# Patient Record
Sex: FEMALE | Race: WHITE | NOT HISPANIC OR LATINO
[De-identification: names, ages, dates, MRNs, and addresses within clinical notes are randomized per-mention and may not be internally consistent; named-entity substitution may affect disease eponyms.]

---

## 2017-02-07 ENCOUNTER — APPOINTMENT (OUTPATIENT)
Dept: FAMILY MEDICINE | Facility: CLINIC | Age: 82
End: 2017-02-07

## 2017-02-07 VITALS — TEMPERATURE: 97.4 F | DIASTOLIC BLOOD PRESSURE: 66 MMHG | SYSTOLIC BLOOD PRESSURE: 120 MMHG

## 2017-02-10 LAB — HBA1C MFR BLD HPLC: 5.9

## 2017-03-31 ENCOUNTER — APPOINTMENT (OUTPATIENT)
Dept: FAMILY MEDICINE | Facility: CLINIC | Age: 82
End: 2017-03-31

## 2017-03-31 VITALS — TEMPERATURE: 98.1 F | SYSTOLIC BLOOD PRESSURE: 120 MMHG | DIASTOLIC BLOOD PRESSURE: 70 MMHG

## 2017-03-31 DIAGNOSIS — F32.9 MAJOR DEPRESSIVE DISORDER, SINGLE EPISODE, UNSPECIFIED: ICD-10-CM

## 2017-04-01 PROBLEM — F32.9 DEPRESSION: Status: ACTIVE | Noted: 2017-04-01

## 2017-05-02 ENCOUNTER — RX RENEWAL (OUTPATIENT)
Age: 82
End: 2017-05-02

## 2017-09-26 ENCOUNTER — APPOINTMENT (OUTPATIENT)
Dept: FAMILY MEDICINE | Facility: CLINIC | Age: 82
End: 2017-09-26
Payer: MEDICARE

## 2017-09-26 VITALS — SYSTOLIC BLOOD PRESSURE: 119 MMHG | DIASTOLIC BLOOD PRESSURE: 60 MMHG

## 2017-09-26 DIAGNOSIS — R05 COUGH: ICD-10-CM

## 2017-09-26 PROCEDURE — 99213 OFFICE O/P EST LOW 20 MIN: CPT | Mod: 25

## 2017-09-26 PROCEDURE — G0008: CPT

## 2017-09-26 PROCEDURE — 90662 IIV NO PRSV INCREASED AG IM: CPT

## 2018-01-09 ENCOUNTER — APPOINTMENT (OUTPATIENT)
Dept: FAMILY MEDICINE | Facility: CLINIC | Age: 83
End: 2018-01-09
Payer: MEDICARE

## 2018-01-15 ENCOUNTER — APPOINTMENT (OUTPATIENT)
Dept: FAMILY MEDICINE | Facility: CLINIC | Age: 83
End: 2018-01-15
Payer: MEDICARE

## 2018-01-15 VITALS — TEMPERATURE: 97.7 F | DIASTOLIC BLOOD PRESSURE: 72 MMHG | SYSTOLIC BLOOD PRESSURE: 124 MMHG

## 2018-01-15 LAB
25(OH)D3 SERPL-MCNC: 39.4 NG/ML
ALBUMIN SERPL ELPH-MCNC: 4.5 G/DL
ALP BLD-CCNC: 79 U/L
ALT SERPL-CCNC: 15 U/L
ANION GAP SERPL CALC-SCNC: 16 MMOL/L
APPEARANCE: CLEAR
AST SERPL-CCNC: 29 U/L
BACTERIA: NEGATIVE
BASOPHILS # BLD AUTO: 0.02 K/UL
BASOPHILS NFR BLD AUTO: 0.3 %
BILIRUB SERPL-MCNC: 0.4 MG/DL
BILIRUBIN URINE: NEGATIVE
BLOOD URINE: NEGATIVE
BUN SERPL-MCNC: 30 MG/DL
CALCIUM SERPL-MCNC: 9.9 MG/DL
CHLORIDE SERPL-SCNC: 98 MMOL/L
CHOLEST SERPL-MCNC: 284 MG/DL
CHOLEST/HDLC SERPL: 3 RATIO
CO2 SERPL-SCNC: 26 MMOL/L
COLOR: YELLOW
CREAT SERPL-MCNC: 1.17 MG/DL
EOSINOPHIL # BLD AUTO: 0.15 K/UL
EOSINOPHIL NFR BLD AUTO: 2.1 %
FOLATE SERPL-MCNC: >20 NG/ML
GGT SERPL-CCNC: 21 U/L
GLUCOSE QUALITATIVE U: NEGATIVE MG/DL
GLUCOSE SERPL-MCNC: 92 MG/DL
HBA1C MFR BLD HPLC: 5.6 %
HCT VFR BLD CALC: 36 %
HDLC SERPL-MCNC: 94 MG/DL
HGB BLD-MCNC: 12.9 G/DL
HYALINE CASTS: 0 /LPF
IMM GRANULOCYTES NFR BLD AUTO: 0.1 %
IRON SERPL-MCNC: 83 UG/DL
KETONES URINE: NEGATIVE
LDLC SERPL CALC-MCNC: 155 MG/DL
LEUKOCYTE ESTERASE URINE: NEGATIVE
LYMPHOCYTES # BLD AUTO: 2.12 K/UL
LYMPHOCYTES NFR BLD AUTO: 30.2 %
MAN DIFF?: NORMAL
MCHC RBC-ENTMCNC: 32.1 PG
MCHC RBC-ENTMCNC: 35.8 GM/DL
MCV RBC AUTO: 89.6 FL
MICROSCOPIC-UA: NORMAL
MONOCYTES # BLD AUTO: 0.87 K/UL
MONOCYTES NFR BLD AUTO: 12.4 %
NEUTROPHILS # BLD AUTO: 3.86 K/UL
NEUTROPHILS NFR BLD AUTO: 54.9 %
NITRITE URINE: NEGATIVE
PH URINE: 6
PLATELET # BLD AUTO: 207 K/UL
POTASSIUM SERPL-SCNC: 4.3 MMOL/L
PROT SERPL-MCNC: 7.9 G/DL
PROTEIN URINE: NEGATIVE MG/DL
RBC # BLD: 4.02 M/UL
RBC # FLD: 14 %
RED BLOOD CELLS URINE: 1 /HPF
SODIUM SERPL-SCNC: 140 MMOL/L
SPECIFIC GRAVITY URINE: 1.01
SQUAMOUS EPITHELIAL CELLS: 1 /HPF
TRIGL SERPL-MCNC: 175 MG/DL
TSH SERPL-ACNC: 2.6 UIU/ML
UROBILINOGEN URINE: NEGATIVE MG/DL
VIT B12 SERPL-MCNC: 1076 PG/ML
WBC # FLD AUTO: 7.03 K/UL
WHITE BLOOD CELLS URINE: 1 /HPF

## 2018-01-15 PROCEDURE — 99214 OFFICE O/P EST MOD 30 MIN: CPT

## 2018-03-01 ENCOUNTER — APPOINTMENT (OUTPATIENT)
Dept: FAMILY MEDICINE | Facility: CLINIC | Age: 83
End: 2018-03-01
Payer: MEDICARE

## 2018-03-01 VITALS — SYSTOLIC BLOOD PRESSURE: 120 MMHG | DIASTOLIC BLOOD PRESSURE: 60 MMHG | TEMPERATURE: 97.6 F

## 2018-03-01 PROCEDURE — 99214 OFFICE O/P EST MOD 30 MIN: CPT | Mod: 25

## 2018-03-01 PROCEDURE — 36415 COLL VENOUS BLD VENIPUNCTURE: CPT

## 2018-03-02 LAB — URATE SERPL-MCNC: 6.7 MG/DL

## 2018-03-14 ENCOUNTER — APPOINTMENT (OUTPATIENT)
Dept: FAMILY MEDICINE | Facility: CLINIC | Age: 83
End: 2018-03-14
Payer: MEDICARE

## 2018-03-14 VITALS — TEMPERATURE: 97.6 F | DIASTOLIC BLOOD PRESSURE: 60 MMHG | SYSTOLIC BLOOD PRESSURE: 130 MMHG

## 2018-03-14 PROCEDURE — 99214 OFFICE O/P EST MOD 30 MIN: CPT

## 2018-05-04 ENCOUNTER — APPOINTMENT (OUTPATIENT)
Dept: FAMILY MEDICINE | Facility: CLINIC | Age: 83
End: 2018-05-04
Payer: MEDICARE

## 2018-05-04 VITALS — SYSTOLIC BLOOD PRESSURE: 100 MMHG | TEMPERATURE: 97.8 F | DIASTOLIC BLOOD PRESSURE: 60 MMHG

## 2018-05-04 DIAGNOSIS — M10.9 GOUT, UNSPECIFIED: ICD-10-CM

## 2018-05-04 PROCEDURE — 99213 OFFICE O/P EST LOW 20 MIN: CPT

## 2018-06-29 ENCOUNTER — APPOINTMENT (OUTPATIENT)
Dept: FAMILY MEDICINE | Facility: CLINIC | Age: 83
End: 2018-06-29
Payer: MEDICARE

## 2018-06-29 VITALS — DIASTOLIC BLOOD PRESSURE: 78 MMHG | SYSTOLIC BLOOD PRESSURE: 134 MMHG | TEMPERATURE: 97.8 F

## 2018-06-29 PROCEDURE — 81003 URINALYSIS AUTO W/O SCOPE: CPT | Mod: QW

## 2018-06-29 PROCEDURE — 99213 OFFICE O/P EST LOW 20 MIN: CPT | Mod: 25

## 2018-06-29 NOTE — PHYSICAL EXAM
[No Acute Distress] : no acute distress [Soft] : abdomen soft [No CVA Tenderness] : no CVA  tenderness [No Rash] : no rash

## 2018-06-29 NOTE — REVIEW OF SYSTEMS
[Dysuria] : dysuria [Incontinence] : incontinence [Nocturia] : nocturia [Frequency] : frequency [Negative] : Integumentary

## 2018-06-29 NOTE — ASSESSMENT
[FreeTextEntry1] : She presents to office date one day history of burning frequency and urgency. Denies any blood in urine or any back pain denies any fever or chills.\par UA dip + Blood + Leuk\par Pt to start on MAcrobid\par Urine C&S sent

## 2018-06-29 NOTE — HISTORY OF PRESENT ILLNESS
[FreeTextEntry8] : She presents to office date one day history of burning frequency and urgency. Denies any blood in urine or any back pain denies any fever or chills.

## 2018-07-02 LAB — BACTERIA UR CULT: ABNORMAL

## 2018-07-05 LAB
BILIRUB UR QL STRIP: NEGATIVE
CLARITY UR: NORMAL
COLLECTION METHOD: NORMAL
GLUCOSE UR-MCNC: NEGATIVE
HCG UR QL: 3.2 EU/DL
HGB UR QL STRIP.AUTO: NORMAL
KETONES UR-MCNC: NEGATIVE
LEUKOCYTE ESTERASE UR QL STRIP: NORMAL
NITRITE UR QL STRIP: NEGATIVE
PH UR STRIP: 6.5
PROT UR STRIP-MCNC: NEGATIVE
SP GR UR STRIP: 1.01

## 2018-07-15 ENCOUNTER — TRANSCRIPTION ENCOUNTER (OUTPATIENT)
Age: 83
End: 2018-07-15

## 2018-08-03 LAB
APPEARANCE: CLEAR
BACTERIA UR CULT: NORMAL
BACTERIA: NEGATIVE
BILIRUBIN URINE: NEGATIVE
BLOOD URINE: NEGATIVE
COLOR: YELLOW
GLUCOSE QUALITATIVE U: NEGATIVE MG/DL
HYALINE CASTS: 0 /LPF
KETONES URINE: NEGATIVE
LEUKOCYTE ESTERASE URINE: NEGATIVE
MICROSCOPIC-UA: NORMAL
NITRITE URINE: NEGATIVE
PH URINE: 7
PROTEIN URINE: NEGATIVE MG/DL
RED BLOOD CELLS URINE: 0 /HPF
SPECIFIC GRAVITY URINE: 1.01
SQUAMOUS EPITHELIAL CELLS: 1 /HPF
UROBILINOGEN URINE: NEGATIVE MG/DL
WHITE BLOOD CELLS URINE: 2 /HPF

## 2018-11-01 ENCOUNTER — APPOINTMENT (OUTPATIENT)
Dept: FAMILY MEDICINE | Facility: CLINIC | Age: 83
End: 2018-11-01
Payer: MEDICARE

## 2018-11-01 VITALS — DIASTOLIC BLOOD PRESSURE: 70 MMHG | SYSTOLIC BLOOD PRESSURE: 124 MMHG

## 2018-11-01 PROCEDURE — 99213 OFFICE O/P EST LOW 20 MIN: CPT | Mod: 25

## 2018-11-01 PROCEDURE — G0008: CPT

## 2018-11-01 PROCEDURE — 90662 IIV NO PRSV INCREASED AG IM: CPT

## 2018-11-01 RX ORDER — ACETAZOLAMIDE 500 MG/1
500 CAPSULE ORAL
Qty: 10 | Refills: 0 | Status: COMPLETED | COMMUNITY
Start: 2017-07-21 | End: 2018-11-01

## 2019-01-24 ENCOUNTER — NON-APPOINTMENT (OUTPATIENT)
Age: 84
End: 2019-01-24

## 2019-01-24 ENCOUNTER — APPOINTMENT (OUTPATIENT)
Dept: FAMILY MEDICINE | Facility: CLINIC | Age: 84
End: 2019-01-24
Payer: MEDICARE

## 2019-01-24 VITALS
BODY MASS INDEX: 23.08 KG/M2 | DIASTOLIC BLOOD PRESSURE: 70 MMHG | HEART RATE: 84 BPM | SYSTOLIC BLOOD PRESSURE: 126 MMHG | RESPIRATION RATE: 14 BRPM | OXYGEN SATURATION: 98 % | HEIGHT: 62.2 IN | WEIGHT: 127 LBS | TEMPERATURE: 97.9 F

## 2019-01-24 PROCEDURE — G0009: CPT

## 2019-01-24 PROCEDURE — G0439: CPT

## 2019-01-24 PROCEDURE — 93000 ELECTROCARDIOGRAM COMPLETE: CPT | Mod: 59

## 2019-01-24 PROCEDURE — 90670 PCV13 VACCINE IM: CPT

## 2019-01-24 NOTE — HISTORY OF PRESENT ILLNESS
[de-identified] : Patient presents to office for routine Medicare wellness exam. Patient states yesterday she ate at a local Mexican restaurant 8 hours later severe diarrhea and abdominal pain. Patient spoke with the Department of Health. Patient states she has not had a bowel movement in the last 6 hours abdominal cramping has decreased. Denies any blood in the stool.The patient takes simvastatin 10 mg daily\par Also takes chlorthalidone to 3 times a month when she gets swelling of her ankles

## 2019-01-24 NOTE — ASSESSMENT
[FreeTextEntry1] : Patient presents to office for routine Medicare wellness exam. Patient states yesterday she ate at a local [place] restaurant 8 hours later severe diarrhea and abdominal pain. Patient spoke with the Department of Health. Patient states she has not had a bowel movement in the last 6 hours abdominal cramping has decreased. Denies any blood in the stool.The patient takes simvastatin 10 mg daily\par Also takes chlorthalidone to 3 times a month when she gets swelling of her ankles\par \par Labs normal\par EKG NSR\par \par Pt was encouraged to hydrate\par Refer for Shingrix\par Prevnar given today

## 2019-02-07 LAB — HBA1C MFR BLD: 5.6

## 2019-04-25 ENCOUNTER — APPOINTMENT (OUTPATIENT)
Dept: FAMILY MEDICINE | Facility: CLINIC | Age: 84
End: 2019-04-25
Payer: MEDICARE

## 2019-04-25 VITALS — SYSTOLIC BLOOD PRESSURE: 108 MMHG | DIASTOLIC BLOOD PRESSURE: 70 MMHG

## 2019-04-25 PROCEDURE — 99213 OFFICE O/P EST LOW 20 MIN: CPT

## 2019-04-25 NOTE — PLAN
[FreeTextEntry1] : Hypertension\par - Reduce HCTZ by half PRN for ankle swelling\par - Increase hydration\par - F/u in 3 mo

## 2019-04-25 NOTE — ADDENDUM
[FreeTextEntry1] : I, Miguel Acarol Benavidezakiko, acted solely as a scribe for Dr. Coreas on this date 04/25/2019.\par \par All medical record entries made by the Scribe were at my, Dr. Coreas, direction and personally dictated by me on 04/25/2019. I have reviewed the chart and agree that the record accurately reflects my personal performance of the history, physical exam, assessment and plan.  I have also personally directed, reviewed and agreed with the chart.

## 2019-04-25 NOTE — REVIEW OF SYSTEMS
[Joint Stiffness] : joint stiffness [Joint Pain] : joint pain [Dizziness] : dizziness [Negative] : Integumentary [FreeTextEntry9] : Left ankle pain

## 2019-04-25 NOTE — HISTORY OF PRESENT ILLNESS
[FreeTextEntry8] : 89y/o F w/ PMHx of HTN, NLD, Arthritis (hands, back, neck), gout and spinal stenosis presents to the office for follow up with BP medication. Pt states 6 days ago pt returned home after dinner and describes left foot being swollen and started taking 1/2 dose of HCTZ daily with resolution of swollen ankle 2 days ago.  Pt continues to have pain in the joint, also describes being jittery.  , HR 64. Admits to poor hydration. Pt had appointment with Podiatry this morning and was told that she had arthritis in her left ankle. \par

## 2019-04-25 NOTE — PHYSICAL EXAM
[Normal] : normal gait, coordination grossly intact, no focal deficits [de-identified] : spider veins on b/l ankles, DP pulses 2+ b/l. [de-identified] : Warm, dry, intact.  No rashes.

## 2019-05-15 ENCOUNTER — RX RENEWAL (OUTPATIENT)
Age: 84
End: 2019-05-15

## 2019-07-18 ENCOUNTER — APPOINTMENT (OUTPATIENT)
Dept: FAMILY MEDICINE | Facility: CLINIC | Age: 84
End: 2019-07-18
Payer: MEDICARE

## 2019-07-18 VITALS — TEMPERATURE: 97.7 F | SYSTOLIC BLOOD PRESSURE: 110 MMHG | DIASTOLIC BLOOD PRESSURE: 70 MMHG

## 2019-07-18 PROCEDURE — 99213 OFFICE O/P EST LOW 20 MIN: CPT

## 2019-07-18 NOTE — ADDENDUM
[FreeTextEntry1] : I, Michaela Martinez, acted solely as a scribe for Dr. Coreas on this date 07/18/2019.\par \par All medical record entries made by the Scribe were at my, Dr. Coreas, direction and personally dictated by me on 07/18/2019. I have reviewed the chart and agree that the record accurately reflects my personal performance of the history, physical exam, assessment and plan.  I have also personally directed, reviewed and agreed with the chart.

## 2019-07-18 NOTE — PHYSICAL EXAM
[Normal] : no acute distress, well-nourished, well developed, well appearing [de-identified] : Varicose veins noted b/l lower extremities . Mild 0-1+Pitting edema on L ankle. Pulses intact b/l  [de-identified] : AA&Ox3

## 2019-07-18 NOTE — REVIEW OF SYSTEMS
[Lower Ext Edema] : lower extremity edema [Negative] : Neurological [Cough] : cough [Chest Pain] : no chest pain [Shortness Of Breath] : no shortness of breath [Nausea] : no nausea [Vomiting] : no vomiting [Headache] : no headache [Dizziness] : no dizziness [FreeTextEntry5] : L ankle  [FreeTextEntry6] : chronic, intermittent

## 2019-07-18 NOTE — HISTORY OF PRESENT ILLNESS
[FreeTextEntry8] : 89y/o F with PMHx of DVT, HTN, HLD, Spinal stenosis and Depression presents to the office for 3 month f/u on Left LE painful swollen edema. Pt has been using HcTZ PRN for swelling. Pt states she has just returned from 3hr car ride. Pt admits to not using compression stockings for edema relief. Pt additionally c/o chronic cough, has been using smart vest with improvement. BP in office is 110/70. Last PNA immunization 1 year ago. Denies HA, CP, SOB, N/V, lightheadedness, dizziness or near syncopal events.\par

## 2019-07-18 NOTE — PLAN
[FreeTextEntry1] : LE edema\par - Support stockings \par - Elevate LE \par  \par \par Cough\par - Use smartvest \par \par Health Maintenance\par - F/u in November for Flu shot.

## 2019-09-14 ENCOUNTER — TRANSCRIPTION ENCOUNTER (OUTPATIENT)
Age: 84
End: 2019-09-14

## 2019-12-13 ENCOUNTER — APPOINTMENT (OUTPATIENT)
Dept: FAMILY MEDICINE | Facility: CLINIC | Age: 84
End: 2019-12-13
Payer: MEDICARE

## 2019-12-13 VITALS — SYSTOLIC BLOOD PRESSURE: 130 MMHG | DIASTOLIC BLOOD PRESSURE: 80 MMHG

## 2019-12-13 PROCEDURE — 36415 COLL VENOUS BLD VENIPUNCTURE: CPT

## 2019-12-13 PROCEDURE — 99214 OFFICE O/P EST MOD 30 MIN: CPT | Mod: 25

## 2019-12-13 NOTE — PHYSICAL EXAM
[Normal] : no acute distress, well-nourished, well developed, well appearing [de-identified] : AA&Ox3  [de-identified] : j [de-identified] : ambulates with a cane

## 2019-12-13 NOTE — ADDENDUM
[FreeTextEntry1] : I, Arielle Carmona, acted solely as a scribe for Dr. Coreas on this date 12/13/2019.\par \par All medical record entries made by the Scribe were at my, Dr. Coreas, direction and personally dictated by me on 12/13/2019. I have reviewed the chart and agree that the record accurately reflects my personal performance of the history, physical exam, assessment and plan.  I have also personally directed, reviewed and agreed with the chart.

## 2019-12-13 NOTE — HISTORY OF PRESENT ILLNESS
[de-identified] : 89y/o F with PMHx of DVT, HTN, HLD, Gout, and Depression presents to the office with c/o multiple health complaints. Pt is accompanied by son. Pt reports falling 3 months ago after leg caught on pants, landed on her buttock and elbow, and punctured hole on left hand which has resolved. Additionally, Pt reports feeling jitteriness and palpitations 10 days after the fall, and went to . Had echo and vitals reviewed by cardio, normal. However, continues to c/o jitteriness within her body. She has d/c caffeine and vitamins without change. Having difficulty sleeping. Pt also c/o fatigue. Pt also reports adverse side effects to flu vaccine including fever (T102), chills, HA, and shakiness. Pt c/o difficulty standing due to back pain. Pt will be starting PT for strength. Ambulates with a cane.

## 2019-12-13 NOTE — PLAN
[FreeTextEntry1] : Anxiety\par - Start CBD oil\par - Blood work to include TFT, BMP, and CBC, drawn in office today\par - Call office in 4 days

## 2019-12-18 ENCOUNTER — RX RENEWAL (OUTPATIENT)
Age: 84
End: 2019-12-18

## 2019-12-18 ENCOUNTER — APPOINTMENT (OUTPATIENT)
Dept: FAMILY MEDICINE | Facility: CLINIC | Age: 84
End: 2019-12-18
Payer: MEDICARE

## 2019-12-18 VITALS — SYSTOLIC BLOOD PRESSURE: 120 MMHG | TEMPERATURE: 96.8 F | DIASTOLIC BLOOD PRESSURE: 80 MMHG

## 2019-12-18 DIAGNOSIS — R39.9 UNSPECIFIED SYMPTOMS AND SIGNS INVOLVING THE GENITOURINARY SYSTEM: ICD-10-CM

## 2019-12-18 LAB
ANION GAP SERPL CALC-SCNC: 11 MMOL/L
BASOPHILS # BLD AUTO: 0.02 K/UL
BASOPHILS NFR BLD AUTO: 0.3 %
BILIRUB UR QL STRIP: NEGATIVE
BUN SERPL-MCNC: 26 MG/DL
CALCIUM SERPL-MCNC: 9.8 MG/DL
CHLORIDE SERPL-SCNC: 102 MMOL/L
CLARITY UR: CLEAR
CO2 SERPL-SCNC: 26 MMOL/L
COLLECTION METHOD: NORMAL
CREAT SERPL-MCNC: 1.11 MG/DL
EOSINOPHIL # BLD AUTO: 0.11 K/UL
EOSINOPHIL NFR BLD AUTO: 1.9 %
GLUCOSE SERPL-MCNC: 117 MG/DL
GLUCOSE UR-MCNC: NEGATIVE
HCG UR QL: 3.2 EU/DL
HCT VFR BLD CALC: 40.7 %
HGB BLD-MCNC: 13 G/DL
HGB UR QL STRIP.AUTO: NORMAL
IMM GRANULOCYTES NFR BLD AUTO: 0.3 %
KETONES UR-MCNC: NEGATIVE
LEUKOCYTE ESTERASE UR QL STRIP: NORMAL
LYMPHOCYTES # BLD AUTO: 1.59 K/UL
LYMPHOCYTES NFR BLD AUTO: 27.3 %
MAN DIFF?: NORMAL
MCHC RBC-ENTMCNC: 31.7 PG
MCHC RBC-ENTMCNC: 31.9 GM/DL
MCV RBC AUTO: 99.3 FL
MONOCYTES # BLD AUTO: 0.6 K/UL
MONOCYTES NFR BLD AUTO: 10.3 %
NEUTROPHILS # BLD AUTO: 3.48 K/UL
NEUTROPHILS NFR BLD AUTO: 59.9 %
NITRITE UR QL STRIP: NEGATIVE
PH UR STRIP: 5
PLATELET # BLD AUTO: 191 K/UL
POTASSIUM SERPL-SCNC: 4.4 MMOL/L
PROT UR STRIP-MCNC: NORMAL
RBC # BLD: 4.1 M/UL
RBC # FLD: 13.2 %
SODIUM SERPL-SCNC: 139 MMOL/L
SP GR UR STRIP: 1.02
T3 SERPL-MCNC: 83 NG/DL
T4 SERPL-MCNC: 6.7 UG/DL
TSH SERPL-ACNC: 2.19 UIU/ML
WBC # FLD AUTO: 5.82 K/UL

## 2019-12-18 PROCEDURE — 99213 OFFICE O/P EST LOW 20 MIN: CPT | Mod: 25

## 2019-12-18 PROCEDURE — 81003 URINALYSIS AUTO W/O SCOPE: CPT | Mod: QW

## 2019-12-18 RX ORDER — AZITHROMYCIN 250 MG/1
250 TABLET, FILM COATED ORAL
Qty: 1 | Refills: 0 | Status: DISCONTINUED | COMMUNITY
Start: 2019-01-31 | End: 2019-12-18

## 2019-12-18 RX ORDER — NITROFURANTOIN (MONOHYDRATE/MACROCRYSTALS) 25; 75 MG/1; MG/1
100 CAPSULE ORAL
Qty: 14 | Refills: 0 | Status: DISCONTINUED | COMMUNITY
Start: 2018-06-29 | End: 2019-12-18

## 2019-12-18 RX ORDER — BENZONATATE 100 MG/1
100 CAPSULE ORAL
Qty: 30 | Refills: 5 | Status: DISCONTINUED | COMMUNITY
Start: 2019-01-31 | End: 2019-12-18

## 2019-12-20 RX ORDER — NITROFURANTOIN MACROCRYSTALS 100 MG/1
100 CAPSULE ORAL
Qty: 10 | Refills: 0 | Status: DISCONTINUED | COMMUNITY
Start: 2019-12-18 | End: 2019-12-20

## 2019-12-23 LAB
APPEARANCE: ABNORMAL
BACTERIA UR CULT: ABNORMAL
BACTERIA: NEGATIVE
BILIRUBIN URINE: NEGATIVE
BLOOD URINE: ABNORMAL
COLOR: ABNORMAL
GLUCOSE QUALITATIVE U: NEGATIVE
HYALINE CASTS: 0 /LPF
KETONES URINE: NEGATIVE
LEUKOCYTE ESTERASE URINE: ABNORMAL
MICROSCOPIC-UA: NORMAL
NITRITE URINE: NEGATIVE
PH URINE: 6
PROTEIN URINE: NORMAL
RED BLOOD CELLS URINE: 97 /HPF
SPECIFIC GRAVITY URINE: 1.02
SQUAMOUS EPITHELIAL CELLS: 6 /HPF
URINE COMMENTS: NORMAL
UROBILINOGEN URINE: NORMAL
WHITE BLOOD CELLS URINE: 182 /HPF

## 2019-12-26 ENCOUNTER — APPOINTMENT (OUTPATIENT)
Dept: FAMILY MEDICINE | Facility: CLINIC | Age: 84
End: 2019-12-26

## 2020-02-20 ENCOUNTER — RX RENEWAL (OUTPATIENT)
Age: 85
End: 2020-02-20

## 2020-02-21 ENCOUNTER — APPOINTMENT (OUTPATIENT)
Dept: FAMILY MEDICINE | Facility: CLINIC | Age: 85
End: 2020-02-21
Payer: MEDICARE

## 2020-02-21 VITALS — SYSTOLIC BLOOD PRESSURE: 120 MMHG | OXYGEN SATURATION: 93 % | HEART RATE: 86 BPM | DIASTOLIC BLOOD PRESSURE: 66 MMHG

## 2020-02-21 PROCEDURE — 99214 OFFICE O/P EST MOD 30 MIN: CPT

## 2020-02-21 NOTE — HISTORY OF PRESENT ILLNESS
[FreeTextEntry8] : 89y/o F with PMHx of DVT, HTN, HLD, Bronchiectasis, Gout, and Anxiety presents to the office with c/o constant SOB and jittery. Worse when walking and climbing stairs. Has h/o bronchiectasis. Last saw pulmonology 6 months ago. O2 sat in office is 93%. Last ECHO 6 months ago showing mild mitral valve stenosis. Pt continues to c/o LE edema and taking Chlorthalidone 12.5mg. Pt also c/o numbness of left foot. Ambulates with cane.

## 2020-02-21 NOTE — ADDENDUM
[FreeTextEntry1] : I, Arielle Tyin, acted solely as a scribe for Dr. Coreas on this date 02/21/2020.\par \par All medical record entries made by the Scribe were at my, Dr. Coresa, direction and personally dictated by me on 02/21/2020. I have reviewed the chart and agree that the record accurately reflects my personal performance of the history, physical exam, assessment and plan.  I have also personally directed, reviewed and agreed with the chart.

## 2020-02-21 NOTE — REVIEW OF SYSTEMS
[Lower Ext Edema] : lower extremity edema [Shortness Of Breath] : shortness of breath [Negative] : Integumentary [de-identified] : numbness of left foot

## 2020-02-21 NOTE — PLAN
[FreeTextEntry1] : LE edema\par - Increase Chlorthalidone to 25mg daily x 1 weekthen 1/2 prn ankle swelling\par - Elevate legs \par - Call office in 1 week \par \par Bronchiectasis\par - Recommend f/u with pulmonology in 1-2 months \par \par Mitral stenosis\par rec Cardio f/up

## 2020-02-21 NOTE — PHYSICAL EXAM
[Normal] : normal rate, regular rhythm, normal S1/S2, no murmur [de-identified] : inspiratory wheeze of upper lobes b/l [de-identified] : 1+ pitting edema on L ankle. varicose veins b/l LE [de-identified] : ambulates with cane  [de-identified] : AA&Ox3

## 2020-06-09 ENCOUNTER — APPOINTMENT (OUTPATIENT)
Dept: FAMILY MEDICINE | Facility: CLINIC | Age: 85
End: 2020-06-09
Payer: MEDICARE

## 2020-06-09 VITALS — TEMPERATURE: 97 F | OXYGEN SATURATION: 97 %

## 2020-06-09 DIAGNOSIS — T78.40XA ALLERGY, UNSPECIFIED, INITIAL ENCOUNTER: ICD-10-CM

## 2020-06-09 PROCEDURE — 99214 OFFICE O/P EST MOD 30 MIN: CPT

## 2020-06-09 NOTE — HISTORY OF PRESENT ILLNESS
[FreeTextEntry8] : 89 y/o F (Bronchiectasis) presents to office c/o 1 day hx of swelling and pain after a possible bite on her right side of head. Patient states that she was in her home state unsure of what is her had several bites however the worst is in the right side of her head with a swollen painful reaction. Patient also complaining of a right-sided headache and right eye blurry vision after subsequent bites. Patient is scheduled for dental surgery today and is here for recommendation whether or not to proceed forward with the dental surgery. No fever or chills. Patient does have a history of bronchiectasis.Also has had swollen ankles for few days took Lasix last night with some improvement

## 2020-06-09 NOTE — END OF VISIT
[Time Spent: ___ minutes] : I have spent [unfilled] minutes of time on the encounter. Head is atraumatic. Head shape is symmetrical.

## 2020-06-09 NOTE — PHYSICAL EXAM
[No Respiratory Distress] : no respiratory distress  [No Accessory Muscle Use] : no accessory muscle use [Normal] : normal rate, regular rhythm, normal S1 and S2 and no murmur heard [de-identified] : hearing aid  [de-identified] : Some rhonchi noted [de-identified] : B/L edema of feet and ankles 1-2+ , varocosities of both Lower extremites [de-identified] : right side of head post auricular red and swollen with center site of bite

## 2020-06-09 NOTE — REVIEW OF SYSTEMS
[Vision Problems] : vision problems [Lower Ext Edema] : lower extremity edema [Shortness Of Breath] : shortness of breath [Headache] : headache [Unsteady Walking] : ataxia [Negative] : Cardiovascular [FreeTextEntry3] : right eye blurry [FreeTextEntry4] : right side of head pain  [de-identified] : swelling and redness of right side of head. few scattered bug bites on upper back [de-identified] : right sided HA

## 2020-06-12 ENCOUNTER — APPOINTMENT (OUTPATIENT)
Dept: FAMILY MEDICINE | Facility: CLINIC | Age: 85
End: 2020-06-12
Payer: MEDICARE

## 2020-06-12 PROCEDURE — 99442: CPT | Mod: 95

## 2020-07-01 ENCOUNTER — TRANSCRIPTION ENCOUNTER (OUTPATIENT)
Age: 85
End: 2020-07-01

## 2020-07-16 ENCOUNTER — APPOINTMENT (OUTPATIENT)
Dept: FAMILY MEDICINE | Facility: CLINIC | Age: 85
End: 2020-07-16
Payer: MEDICARE

## 2020-07-16 PROCEDURE — 99442: CPT | Mod: CS,95

## 2020-09-01 LAB
ALBUMIN SERPL ELPH-MCNC: 4.3 G/DL
ALP BLD-CCNC: 73 U/L
ALT SERPL-CCNC: 10 U/L
ANION GAP SERPL CALC-SCNC: 14 MMOL/L
AST SERPL-CCNC: 24 U/L
BASOPHILS # BLD AUTO: 0.03 K/UL
BASOPHILS NFR BLD AUTO: 0.5 %
BILIRUB SERPL-MCNC: 0.6 MG/DL
BUN SERPL-MCNC: 20 MG/DL
CALCIUM SERPL-MCNC: 9.6 MG/DL
CHLORIDE SERPL-SCNC: 102 MMOL/L
CO2 SERPL-SCNC: 23 MMOL/L
CREAT SERPL-MCNC: 1.18 MG/DL
EOSINOPHIL # BLD AUTO: 0.16 K/UL
EOSINOPHIL NFR BLD AUTO: 2.8 %
GLUCOSE SERPL-MCNC: 103 MG/DL
HCT VFR BLD CALC: 40.4 %
HGB BLD-MCNC: 12.6 G/DL
IMM GRANULOCYTES NFR BLD AUTO: 0.5 %
LYMPHOCYTES # BLD AUTO: 1.61 K/UL
LYMPHOCYTES NFR BLD AUTO: 28.2 %
MAN DIFF?: NORMAL
MCHC RBC-ENTMCNC: 31.2 GM/DL
MCHC RBC-ENTMCNC: 31.4 PG
MCV RBC AUTO: 100.7 FL
MONOCYTES # BLD AUTO: 0.71 K/UL
MONOCYTES NFR BLD AUTO: 12.4 %
NEUTROPHILS # BLD AUTO: 3.17 K/UL
NEUTROPHILS NFR BLD AUTO: 55.6 %
PLATELET # BLD AUTO: 184 K/UL
POTASSIUM SERPL-SCNC: 4.9 MMOL/L
PROT SERPL-MCNC: 7.2 G/DL
RBC # BLD: 4.01 M/UL
RBC # FLD: 14.1 %
SARS-COV-2 IGG SERPL IA-ACNC: 6.37 INDEX
SARS-COV-2 IGG SERPL QL IA: POSITIVE
SODIUM SERPL-SCNC: 139 MMOL/L
WBC # FLD AUTO: 5.71 K/UL

## 2020-09-04 ENCOUNTER — APPOINTMENT (OUTPATIENT)
Dept: FAMILY MEDICINE | Facility: CLINIC | Age: 85
End: 2020-09-04
Payer: MEDICARE

## 2020-09-04 PROCEDURE — 99442: CPT | Mod: 95

## 2020-10-02 ENCOUNTER — APPOINTMENT (OUTPATIENT)
Dept: FAMILY MEDICINE | Facility: CLINIC | Age: 85
End: 2020-10-02
Payer: MEDICARE

## 2020-10-02 VITALS — TEMPERATURE: 97.7 F | SYSTOLIC BLOOD PRESSURE: 130 MMHG | DIASTOLIC BLOOD PRESSURE: 80 MMHG

## 2020-10-02 PROCEDURE — 99214 OFFICE O/P EST MOD 30 MIN: CPT | Mod: 25

## 2020-10-02 PROCEDURE — G0008: CPT

## 2020-10-02 PROCEDURE — 90662 IIV NO PRSV INCREASED AG IM: CPT

## 2020-10-02 NOTE — HISTORY OF PRESENT ILLNESS
[de-identified] : 89 y/o F PMHx HTN(Chlorthalidone), Bronchiectasis, HLD presents 2 office accompanied by her son for followup. Patient also requests high-dose flu shot today. Patient states that she has left foot and ankle swelling Patient takes half of a chlorthalidone every week or when she needs it.

## 2020-10-02 NOTE — PHYSICAL EXAM
[Normal] : normal rate, regular rhythm, normal S1 and S2 and no murmur heard [de-identified] : +1 pedal and foot edema, Varicosities noted Lower ext b/l

## 2021-03-30 ENCOUNTER — RX RENEWAL (OUTPATIENT)
Age: 86
End: 2021-03-30

## 2021-06-14 ENCOUNTER — APPOINTMENT (OUTPATIENT)
Dept: FAMILY MEDICINE | Facility: CLINIC | Age: 86
End: 2021-06-14
Payer: MEDICARE

## 2021-06-14 VITALS — DIASTOLIC BLOOD PRESSURE: 82 MMHG | SYSTOLIC BLOOD PRESSURE: 136 MMHG | TEMPERATURE: 97.7 F

## 2021-06-14 PROCEDURE — 99214 OFFICE O/P EST MOD 30 MIN: CPT

## 2021-06-14 NOTE — HISTORY OF PRESENT ILLNESS
[FreeTextEntry8] : 89 y/o F PMHx HTN(Chlorthalidone), Bronchiectasis, HLD presents 2 office accompanied by her son c/o swelling in her legs and cough.Pt admits to taking 1/2 Chlorthalidone evry other day but continues to have swelling in her feet and ankles. Also states that every am she has a large amount of thick mucus . Has been taking Tussin.Last saw Pulmonary Dr Alexander last June. Was give Albuterol however  doesn;t take it. Was prescribed Nebulizer however was not able to use machine properly. Denies wheezing but has some mild SOB

## 2021-06-14 NOTE — REVIEW OF SYSTEMS
[Postnasal Drip] : postnasal drip [Lower Ext Edema] : lower extremity edema [Cough] : cough [Negative] : Psychiatric

## 2021-07-02 ENCOUNTER — NON-APPOINTMENT (OUTPATIENT)
Age: 86
End: 2021-07-02

## 2021-07-09 ENCOUNTER — NON-APPOINTMENT (OUTPATIENT)
Age: 86
End: 2021-07-09

## 2021-09-09 ENCOUNTER — APPOINTMENT (OUTPATIENT)
Dept: FAMILY MEDICINE | Facility: CLINIC | Age: 86
End: 2021-09-09
Payer: MEDICARE

## 2021-09-09 VITALS — SYSTOLIC BLOOD PRESSURE: 130 MMHG | TEMPERATURE: 97.5 F | DIASTOLIC BLOOD PRESSURE: 76 MMHG

## 2021-09-09 DIAGNOSIS — H10.9 UNSPECIFIED CONJUNCTIVITIS: ICD-10-CM

## 2021-09-09 PROCEDURE — 99214 OFFICE O/P EST MOD 30 MIN: CPT

## 2021-09-10 PROBLEM — H10.9 CONJUNCTIVITIS: Status: RESOLVED | Noted: 2021-09-09 | Resolved: 2021-10-09

## 2021-09-10 NOTE — HISTORY OF PRESENT ILLNESS
[Family Member] : family member [de-identified] : 89 y/o F PMHx HTN(Chlorthalidone), Bronchiectasis, HLD presents 2 office accompanied by her son to review labs. Pt feels that she is \par "deteriorating".Findings her mobility has decreased over the last 6 months. Patient also complains of a four-day headache 2 weeks ago left-sided only which is now resolved.States that she has aids in the last 6 months finds it difficult to walk. Patient has been using a semi-quad cane but still feels tearful but she will fall. Went out for long periods of time believes a walker Patient is coughing vaccinated. Patient also complains of the last several months food feeling as if it's getting stuck. Feels that she must cut her food into it intensely painful swallowing for fear that she will get food stuck. The groins have no problem. Denies any weight loss. Labs reviewed WNL Also c/o left eye red mildly itchy with minimal crust in eye

## 2021-09-10 NOTE — REVIEW OF SYSTEMS
[Redness] : redness [Muscle Weakness] : muscle weakness [Muscle Pain] : muscle pain [Unsteady Walking] : ataxia [Negative] : Psychiatric [FreeTextEntry7] : sensation of food getting stuck

## 2021-09-10 NOTE — PHYSICAL EXAM
[Normal] : affect was normal and insight and judgment were intact [de-identified] : left conjunctiva injected

## 2021-12-09 ENCOUNTER — NON-APPOINTMENT (OUTPATIENT)
Age: 86
End: 2021-12-09

## 2021-12-14 ENCOUNTER — APPOINTMENT (OUTPATIENT)
Dept: GASTROENTEROLOGY | Facility: CLINIC | Age: 86
End: 2021-12-14
Payer: MEDICARE

## 2021-12-14 VITALS
WEIGHT: 127 LBS | BODY MASS INDEX: 23.08 KG/M2 | HEIGHT: 62.2 IN | DIASTOLIC BLOOD PRESSURE: 58 MMHG | HEART RATE: 70 BPM | SYSTOLIC BLOOD PRESSURE: 113 MMHG

## 2021-12-14 DIAGNOSIS — Z86.010 PERSONAL HISTORY OF COLONIC POLYPS: ICD-10-CM

## 2021-12-14 PROCEDURE — 99204 OFFICE O/P NEW MOD 45 MIN: CPT

## 2021-12-14 RX ORDER — CIPROFLOXACIN HYDROCHLORIDE 500 MG/1
500 TABLET, FILM COATED ORAL
Qty: 10 | Refills: 0 | Status: DISCONTINUED | COMMUNITY
Start: 2018-07-02 | End: 2021-12-14

## 2021-12-14 RX ORDER — BENZONATATE 100 MG/1
100 CAPSULE ORAL
Qty: 30 | Refills: 5 | Status: DISCONTINUED | COMMUNITY
Start: 2021-06-23 | End: 2021-12-14

## 2021-12-14 RX ORDER — OFLOXACIN 3 MG/ML
0.3 SOLUTION/ DROPS OPHTHALMIC 4 TIMES DAILY
Qty: 1 | Refills: 1 | Status: DISCONTINUED | COMMUNITY
Start: 2021-09-09 | End: 2021-12-14

## 2021-12-14 RX ORDER — DOXYCYCLINE HYCLATE 100 MG/1
100 TABLET ORAL
Qty: 14 | Refills: 0 | Status: DISCONTINUED | COMMUNITY
Start: 2020-06-09 | End: 2021-12-14

## 2021-12-14 RX ORDER — CELECOXIB 200 MG/1
200 CAPSULE ORAL
Qty: 30 | Refills: 0 | Status: DISCONTINUED | COMMUNITY
Start: 2018-03-01 | End: 2021-12-14

## 2021-12-14 NOTE — HISTORY OF PRESENT ILLNESS
[FreeTextEntry1] : For at least the past five years, Shadi has had nonproductive cough, followed by Pulmonary.  At one point, she took acid-suppressive medicines, without improvement.  Last year, she noted that a large multivitamin got stuck in the air pipe, she forced herself to cough it out, and then felt better.  However since then, she has noted progressive issues swallowing pills, and is fearful of food getting stuck.  She has been chewing more carefully, and is mindful of cutting food into tiny pieces, avoiding chips or hard foods, not swallowing big pills, and drinking with every swallow.  She is afraid to eat.  She has had rare heartburn, for which she takes Tums.  She denies significant weight loss, change in bowels, or blood in the stool.  A polyp was reportedly removed at colonoscopy (Dr. Bon Matthews)  approximately 20 years ago.

## 2021-12-14 NOTE — PHYSICAL EXAM
[General Appearance - Alert] : alert [General Appearance - In No Acute Distress] : in no acute distress [General Appearance - Well Nourished] : well nourished [General Appearance - Well Developed] : well developed [Sclera] : the sclera and conjunctiva were normal [Neck Appearance] : the appearance of the neck was normal [Neck Cervical Mass (___cm)] : no neck mass was observed [Jugular Venous Distention Increased] : there was no jugular-venous distention [Thyroid Diffuse Enlargement] : the thyroid was not enlarged [Thyroid Nodule] : there were no palpable thyroid nodules [Auscultation Breath Sounds / Voice Sounds] : lungs were clear to auscultation bilaterally [Heart Rate And Rhythm] : heart rate was normal and rhythm regular [Heart Sounds] : normal S1 and S2 [Heart Sounds Gallop] : no gallops [Heart Sounds Pericardial Friction Rub] : no pericardial rub [Full Pulse] : the pedal pulses are present [Edema] : there was no peripheral edema [Bowel Sounds] : normal bowel sounds [Abdomen Soft] : soft [Abdomen Tenderness] : non-tender [Abdomen Mass (___ Cm)] : no abdominal mass palpated [Abdomen Hernia] : no hernia was discovered [Cervical Lymph Nodes Enlarged Posterior Bilaterally] : posterior cervical [Cervical Lymph Nodes Enlarged Anterior Bilaterally] : anterior cervical [Supraclavicular Lymph Nodes Enlarged Bilaterally] : supraclavicular [Inguinal Lymph Nodes Enlarged Bilaterally] : inguinal [Nail Clubbing] : no clubbing  or cyanosis of the fingernails [FreeTextEntry1] : arthritic changes [Skin Color & Pigmentation] : normal skin color and pigmentation [Skin Turgor] : normal skin turgor [] : no rash [Oriented To Time, Place, And Person] : oriented to person, place, and time [Impaired Insight] : insight and judgment were intact [Affect] : the affect was normal

## 2021-12-14 NOTE — CONSULT LETTER
[Dear  ___] : Dear  [unfilled], [Consult Letter:] : I had the pleasure of evaluating your patient, [unfilled]. [Please see my note below.] : Please see my note below. [Consult Closing:] : Thank you very much for allowing me to participate in the care of this patient.  If you have any questions, please do not hesitate to contact me. [Sincerely,] : Sincerely, [FreeTextEntry3] : Dung Rodriguez M.D.\par

## 2021-12-14 NOTE — REVIEW OF SYSTEMS
[Negative] : Heme/Lymph [As Noted in HPI] : as noted in HPI [FreeTextEntry4] : Coughing [FreeTextEntry9] : Weakness in back

## 2021-12-14 NOTE — REASON FOR VISIT
[Consultation] : a consultation visit [Other: _____] : [unfilled] [FreeTextEntry1] : Difficult to swallow, chronic cough, congestion

## 2021-12-16 ENCOUNTER — APPOINTMENT (OUTPATIENT)
Dept: FAMILY MEDICINE | Facility: CLINIC | Age: 86
End: 2021-12-16

## 2022-01-25 ENCOUNTER — APPOINTMENT (OUTPATIENT)
Dept: RADIOLOGY | Facility: HOSPITAL | Age: 87
End: 2022-01-25

## 2022-01-28 ENCOUNTER — APPOINTMENT (OUTPATIENT)
Dept: FAMILY MEDICINE | Facility: CLINIC | Age: 87
End: 2022-01-28
Payer: COMMERCIAL

## 2022-01-28 VITALS — TEMPERATURE: 96.2 F | DIASTOLIC BLOOD PRESSURE: 72 MMHG | SYSTOLIC BLOOD PRESSURE: 124 MMHG

## 2022-01-28 DIAGNOSIS — M25.473 EFFUSION, UNSPECIFIED ANKLE: ICD-10-CM

## 2022-01-28 DIAGNOSIS — R53.1 WEAKNESS: ICD-10-CM

## 2022-01-28 DIAGNOSIS — R13.10 DYSPHAGIA, UNSPECIFIED: ICD-10-CM

## 2022-01-28 PROCEDURE — 99215 OFFICE O/P EST HI 40 MIN: CPT | Mod: 25

## 2022-01-28 PROCEDURE — 36415 COLL VENOUS BLD VENIPUNCTURE: CPT

## 2022-01-28 NOTE — REVIEW OF SYSTEMS
[Fatigue] : fatigue [Postnasal Drip] : postnasal drip [Lower Ext Edema] : lower extremity edema [Cough] : cough [Negative] : Psychiatric [FreeTextEntry7] : food getting stuck [FreeTextEntry9] : weakness of upper body

## 2022-01-28 NOTE — HISTORY OF PRESENT ILLNESS
[de-identified] : 89 y/o F PMHx HTN(Chlorthalidone), Bronchiectasis, HLD presents 2 office accompanied by her son for f/up.Pt has changed insurance and needs new script for PT. Continues to  have upper body weakness. Finds it difficult to stand, walk and get up from chair. Takes diuretic every other day for swelling of ankles. Today c/o mil dleft ankle swelling. Pt recently evaluated by GI for "food getting stuck". Was recommended to have barium swallow however patient now refuses. Admits to watching her food intake, cutting her food . No weight loss . Liquids not getting stuck. Pt continues to have thick mucus every am .Takes Tussen nightly which is helpful. Here today for repeat labs

## 2022-01-28 NOTE — PHYSICAL EXAM
[Normal] : affect was normal and insight and judgment were intact [de-identified] : mild edema of left ankle [de-identified] : uses cane for assistance

## 2022-01-31 LAB
ALBUMIN SERPL ELPH-MCNC: 4.4 G/DL
ALP BLD-CCNC: 92 U/L
ALT SERPL-CCNC: 12 U/L
ANION GAP SERPL CALC-SCNC: 14 MMOL/L
AST SERPL-CCNC: 24 U/L
BILIRUB SERPL-MCNC: 0.4 MG/DL
BUN SERPL-MCNC: 25 MG/DL
CALCIUM SERPL-MCNC: 9.8 MG/DL
CHLORIDE SERPL-SCNC: 101 MMOL/L
CHOLEST SERPL-MCNC: 199 MG/DL
CO2 SERPL-SCNC: 27 MMOL/L
CREAT SERPL-MCNC: 1.1 MG/DL
GLUCOSE SERPL-MCNC: 102 MG/DL
HDLC SERPL-MCNC: 67 MG/DL
LDLC SERPL CALC-MCNC: 111 MG/DL
NONHDLC SERPL-MCNC: 132 MG/DL
POTASSIUM SERPL-SCNC: 4.5 MMOL/L
PROT SERPL-MCNC: 7.5 G/DL
SODIUM SERPL-SCNC: 141 MMOL/L
TRIGL SERPL-MCNC: 107 MG/DL

## 2022-02-01 ENCOUNTER — NON-APPOINTMENT (OUTPATIENT)
Age: 87
End: 2022-02-01

## 2022-03-25 ENCOUNTER — APPOINTMENT (OUTPATIENT)
Dept: FAMILY MEDICINE | Facility: CLINIC | Age: 87
End: 2022-03-25
Payer: MEDICARE

## 2022-03-25 VITALS
OXYGEN SATURATION: 97 % | HEART RATE: 73 BPM | SYSTOLIC BLOOD PRESSURE: 116 MMHG | TEMPERATURE: 97.7 F | RESPIRATION RATE: 14 BRPM | DIASTOLIC BLOOD PRESSURE: 66 MMHG

## 2022-03-25 DIAGNOSIS — H92.02 OTALGIA, LEFT EAR: ICD-10-CM

## 2022-03-25 DIAGNOSIS — R51.9 HEADACHE, UNSPECIFIED: ICD-10-CM

## 2022-03-25 PROCEDURE — 99214 OFFICE O/P EST MOD 30 MIN: CPT

## 2022-03-28 PROBLEM — H92.02 DISCOMFORT OF LEFT EAR: Status: ACTIVE | Noted: 2022-03-28

## 2022-03-28 NOTE — ASSESSMENT
[FreeTextEntry1] : VSS, exam normal\par symptoms may be due to trigeminal neuralgia vs temporal arteritis vs tension headache\par declines trial of gabapentin- as it may make her lightheaded \par medication as prescribed, medication education done \par advised to increase fluid intake, rest, and acetaminophen/ibuprofen prn pain/fever\par follow up in 2-3 days\par consider MRI brain and neuro if symptoms persists \par follow up in office if sx persists or worsens\par patient and son verbalizes understanding and patient is stable upon d/c\par

## 2022-03-28 NOTE — HISTORY OF PRESENT ILLNESS
[Family Member] : family member [FreeTextEntry8] : c/o left ear pain x 2-3 days\par started to get electric shocks in ear and head \par ear and neck pain got better\par was left with electric shock in brain which would occur every \par every 3 minutess\par took ibuprofen childrens which helped \par not able to sleep due to discomfort \par this morning took ibuprofen and 2 baby aspirin \par neg covid test \par denies any fever or chills \par denies any dizziness but tired \par denies any nausea \par denies any blurry vision \par saw optho last week on tuesday - did a dilated exam at the time and feels that may have triggered symptoms  \par denies any other complaints or concerns at this time \par

## 2022-04-04 ENCOUNTER — APPOINTMENT (OUTPATIENT)
Dept: FAMILY MEDICINE | Facility: CLINIC | Age: 87
End: 2022-04-04
Payer: COMMERCIAL

## 2022-04-04 ENCOUNTER — NON-APPOINTMENT (OUTPATIENT)
Age: 87
End: 2022-04-04

## 2022-04-04 DIAGNOSIS — G44.85 PRIMARY STABBING HEADACHE: ICD-10-CM

## 2022-04-04 PROCEDURE — 36415 COLL VENOUS BLD VENIPUNCTURE: CPT

## 2022-04-11 ENCOUNTER — NON-APPOINTMENT (OUTPATIENT)
Age: 87
End: 2022-04-11

## 2022-04-14 PROBLEM — G44.85 PRIMARY STABBING HEADACHE: Status: ACTIVE | Noted: 2022-03-25

## 2022-05-25 LAB
BASOPHILS # BLD AUTO: 0.04 K/UL
BASOPHILS # BLD AUTO: 0.04 K/UL
BASOPHILS NFR BLD AUTO: 0.5 %
BASOPHILS NFR BLD AUTO: 0.5 %
CRP SERPL-MCNC: 5 MG/L
CRP SERPL-MCNC: <3 MG/L
EOSINOPHIL # BLD AUTO: 0.47 K/UL
EOSINOPHIL # BLD AUTO: 0.51 K/UL
EOSINOPHIL NFR BLD AUTO: 5.7 %
EOSINOPHIL NFR BLD AUTO: 6.2 %
ERYTHROCYTE [SEDIMENTATION RATE] IN BLOOD BY WESTERGREN METHOD: 62 MM/HR
ERYTHROCYTE [SEDIMENTATION RATE] IN BLOOD BY WESTERGREN METHOD: 70 MM/HR
HCT VFR BLD CALC: 39.2 %
HCT VFR BLD CALC: 42.1 %
HGB BLD-MCNC: 12.1 G/DL
HGB BLD-MCNC: 13 G/DL
IMM GRANULOCYTES NFR BLD AUTO: 0.2 %
IMM GRANULOCYTES NFR BLD AUTO: 0.5 %
LYMPHOCYTES # BLD AUTO: 1.38 K/UL
LYMPHOCYTES # BLD AUTO: 1.68 K/UL
LYMPHOCYTES NFR BLD AUTO: 16.8 %
LYMPHOCYTES NFR BLD AUTO: 20.3 %
MAN DIFF?: NORMAL
MAN DIFF?: NORMAL
MCHC RBC-ENTMCNC: 30.2 PG
MCHC RBC-ENTMCNC: 30.9 GM/DL
MCHC RBC-ENTMCNC: 30.9 GM/DL
MCHC RBC-ENTMCNC: 31.1 PG
MCV RBC AUTO: 100.7 FL
MCV RBC AUTO: 97.8 FL
MONOCYTES # BLD AUTO: 0.79 K/UL
MONOCYTES # BLD AUTO: 1.05 K/UL
MONOCYTES NFR BLD AUTO: 12.7 %
MONOCYTES NFR BLD AUTO: 9.6 %
NEUTROPHILS # BLD AUTO: 4.95 K/UL
NEUTROPHILS # BLD AUTO: 5.53 K/UL
NEUTROPHILS NFR BLD AUTO: 59.8 %
NEUTROPHILS NFR BLD AUTO: 67.2 %
PLATELET # BLD AUTO: 193 K/UL
PLATELET # BLD AUTO: 217 K/UL
RBC # BLD: 4.01 M/UL
RBC # BLD: 4.18 M/UL
RBC # FLD: 13.5 %
RBC # FLD: 14.2 %
WBC # FLD AUTO: 8.23 K/UL
WBC # FLD AUTO: 8.27 K/UL

## 2022-06-08 NOTE — ASSESSMENT
[FreeTextEntry1] : 1.  Dysphagia--unclear if transfer dysphagia and/or esophageal stenosis, such as from osteophyte, severe esophagitis, neoplasm, etc. May have esophageal motility disorder.\par 2.  History of colonic polyp.\par 3.  Hypertension.\par 4.  Hyperlipidemia.\par 5.  History of anxiety/depression.\par 6.  DJD of spine.\par 7.  History of DVT.\par 8.  Chronic renal insufficiency, stage III.\par 9.  Hyperglobulinemia of unclear significance.\par \par Plan:\par 1.  Medical records reviewed.\par 2.  Speech and swallow evaluation (including modified barium swallow/cine-esophagram).\par 3.  Hold off on EGD at this time.\par 4.  No plans for surveillance colonoscopy!
No

## 2022-06-14 ENCOUNTER — APPOINTMENT (OUTPATIENT)
Dept: FAMILY MEDICINE | Facility: CLINIC | Age: 87
End: 2022-06-14
Payer: MEDICARE

## 2022-06-14 VITALS
RESPIRATION RATE: 14 BRPM | WEIGHT: 129 LBS | OXYGEN SATURATION: 97 % | TEMPERATURE: 98 F | BODY MASS INDEX: 23.44 KG/M2 | DIASTOLIC BLOOD PRESSURE: 68 MMHG | HEART RATE: 63 BPM | SYSTOLIC BLOOD PRESSURE: 120 MMHG

## 2022-06-14 DIAGNOSIS — E87.6 HYPOKALEMIA: ICD-10-CM

## 2022-06-14 DIAGNOSIS — R55 SYNCOPE AND COLLAPSE: ICD-10-CM

## 2022-06-14 DIAGNOSIS — E87.5 HYPERKALEMIA: ICD-10-CM

## 2022-06-14 DIAGNOSIS — E86.0 DEHYDRATION: ICD-10-CM

## 2022-06-14 PROCEDURE — 99215 OFFICE O/P EST HI 40 MIN: CPT | Mod: 25

## 2022-06-14 PROCEDURE — 36415 COLL VENOUS BLD VENIPUNCTURE: CPT

## 2022-06-14 NOTE — REVIEW OF SYSTEMS
[Nasal Discharge] : nasal discharge [Postnasal Drip] : postnasal drip [Negative] : Respiratory [FreeTextEntry4] : thick clear mucus [de-identified] : syncope event

## 2022-06-14 NOTE — PHYSICAL EXAM
[Normal] : affect was normal and insight and judgment were intact [de-identified] : mildly SOB with use of accessory Muscles

## 2022-06-14 NOTE — HISTORY OF PRESENT ILLNESS
[de-identified] : 89 y/o F PMHx HTN(Chlorthalidone), Bronchiectasis, HLD presents 2 office accompanied by her son for f/up from ER admission for syncope.pt was [facility] deja sitting at kitchen table felt faint and had syncopal event. Lasted few seconds. Dropped her phone on floor had no recollection of this incident. Went to ER CT scan Neg, EKG No change and labs normal except K 5.2. pt admits to taking Chlorthalidone every other day but had not taken pill that day. Admits to urinating often. Also c/o mucus on throat especially upon awakening. Takes Tussin nightly. Had 1 episode of blood tinge mucus from nose.

## 2022-06-17 LAB
ANION GAP SERPL CALC-SCNC: 10 MMOL/L
BUN SERPL-MCNC: 30 MG/DL
CALCIUM SERPL-MCNC: 9.8 MG/DL
CHLORIDE SERPL-SCNC: 101 MMOL/L
CO2 SERPL-SCNC: 29 MMOL/L
CREAT SERPL-MCNC: 1.11 MG/DL
EGFR: 46 ML/MIN/1.73M2
GLUCOSE SERPL-MCNC: 95 MG/DL
POTASSIUM SERPL-SCNC: 5.2 MMOL/L
SODIUM SERPL-SCNC: 139 MMOL/L

## 2022-08-02 ENCOUNTER — NON-APPOINTMENT (OUTPATIENT)
Age: 87
End: 2022-08-02

## 2022-08-02 ENCOUNTER — APPOINTMENT (OUTPATIENT)
Dept: FAMILY MEDICINE | Facility: CLINIC | Age: 87
End: 2022-08-02

## 2022-08-02 ENCOUNTER — RESULT REVIEW (OUTPATIENT)
Age: 87
End: 2022-08-02

## 2022-08-02 VITALS
RESPIRATION RATE: 14 BRPM | BODY MASS INDEX: 22.72 KG/M2 | WEIGHT: 125 LBS | HEART RATE: 76 BPM | SYSTOLIC BLOOD PRESSURE: 114 MMHG | OXYGEN SATURATION: 97 % | TEMPERATURE: 97.5 F | DIASTOLIC BLOOD PRESSURE: 80 MMHG

## 2022-08-02 DIAGNOSIS — R52 PAIN, UNSPECIFIED: ICD-10-CM

## 2022-08-02 DIAGNOSIS — R20.0 ANESTHESIA OF SKIN: ICD-10-CM

## 2022-08-02 DIAGNOSIS — M54.50 LOW BACK PAIN, UNSPECIFIED: ICD-10-CM

## 2022-08-02 PROCEDURE — 99215 OFFICE O/P EST HI 40 MIN: CPT

## 2022-08-03 PROBLEM — R20.0 NUMBNESS OF LEG: Status: ACTIVE | Noted: 2022-08-03

## 2022-08-03 PROBLEM — R52: Status: ACTIVE | Noted: 2022-08-03

## 2022-08-04 ENCOUNTER — APPOINTMENT (OUTPATIENT)
Dept: RADIOLOGY | Facility: CLINIC | Age: 87
End: 2022-08-04

## 2022-08-04 ENCOUNTER — TRANSCRIPTION ENCOUNTER (OUTPATIENT)
Age: 87
End: 2022-08-04

## 2022-08-04 PROCEDURE — 72100 X-RAY EXAM L-S SPINE 2/3 VWS: CPT

## 2022-08-05 ENCOUNTER — NON-APPOINTMENT (OUTPATIENT)
Age: 87
End: 2022-08-05

## 2022-08-16 DIAGNOSIS — M25.562 PAIN IN LEFT KNEE: ICD-10-CM

## 2022-09-26 ENCOUNTER — RX CHANGE (OUTPATIENT)
Age: 87
End: 2022-09-26

## 2022-10-21 ENCOUNTER — APPOINTMENT (OUTPATIENT)
Dept: FAMILY MEDICINE | Facility: CLINIC | Age: 87
End: 2022-10-21

## 2022-10-21 DIAGNOSIS — Z23 ENCOUNTER FOR IMMUNIZATION: ICD-10-CM

## 2022-10-21 PROCEDURE — G0008: CPT

## 2022-10-21 PROCEDURE — 90662 IIV NO PRSV INCREASED AG IM: CPT

## 2022-12-13 ENCOUNTER — APPOINTMENT (OUTPATIENT)
Dept: FAMILY MEDICINE | Facility: CLINIC | Age: 87
End: 2022-12-13

## 2022-12-13 VITALS
HEART RATE: 86 BPM | DIASTOLIC BLOOD PRESSURE: 72 MMHG | WEIGHT: 119.4 LBS | TEMPERATURE: 97.3 F | SYSTOLIC BLOOD PRESSURE: 120 MMHG | RESPIRATION RATE: 14 BRPM | OXYGEN SATURATION: 95 % | BODY MASS INDEX: 21.7 KG/M2

## 2022-12-13 VITALS — WEIGHT: 120 LBS | BODY MASS INDEX: 21.81 KG/M2

## 2022-12-13 DIAGNOSIS — Z00.00 ENCOUNTER FOR GENERAL ADULT MEDICAL EXAMINATION W/OUT ABNORMAL FINDINGS: ICD-10-CM

## 2022-12-13 DIAGNOSIS — I83.93 ASYMPTOMATIC VARICOSE VEINS OF BILATERAL LOWER EXTREMITIES: ICD-10-CM

## 2022-12-13 PROCEDURE — 99213 OFFICE O/P EST LOW 20 MIN: CPT | Mod: 25

## 2022-12-13 NOTE — HISTORY OF PRESENT ILLNESS
[de-identified] : 89 y/o F PMHx HTN(Chlorthalidone), Bronchiectasis, HLD presents 2 office accompanied by her son., Pt admits to not eating as much. continues ot have coughing mostly in am that is extremely fitful. Takes Tussin as needed. Completed Covid shots and flu shot

## 2022-12-13 NOTE — HISTORY OF PRESENT ILLNESS
[FreeTextEntry8] : 89 y/o F PMHx HTN(Chlorthalidone), Bronchiectasis, HLD presents 2 office accompanied by her granddaughter.Pt states she was in the kitchen and turned  felt sharp pain in her neck and electric shocks in her head that lasted few hours. No slurred speech. Also noticed that her left lkeg went numb and was not able to walk or weight bear on leg for few hours.  Felt shaky and chills. Covid NEG although admitted to fatigue. Pt had beenin a long car ride the day before and had some LBP .Pt admits to falling aslepp with the heating pad on her back. Today feelsbetter all sx resolved.

## 2022-12-13 NOTE — REVIEW OF SYSTEMS
[Fatigue] : fatigue [Negative] : Psychiatric [de-identified] : leftleg numb, electric shocks in head

## 2022-12-13 NOTE — PHYSICAL EXAM
[Normal] : affect was normal and insight and judgment were intact [de-identified] : crackles in lungs [de-identified] : uses walker for walking

## 2022-12-13 NOTE — PHYSICAL EXAM
[No Edema] : there was no peripheral edema [Normal] : affect was normal and insight and judgment were intact [de-identified] : varicosities on lower extremities

## 2023-01-31 ENCOUNTER — APPOINTMENT (OUTPATIENT)
Dept: FAMILY MEDICINE | Facility: CLINIC | Age: 88
End: 2023-01-31
Payer: MEDICARE

## 2023-01-31 VITALS
BODY MASS INDEX: 22.1 KG/M2 | WEIGHT: 121.6 LBS | RESPIRATION RATE: 14 BRPM | TEMPERATURE: 95.7 F | HEART RATE: 71 BPM | OXYGEN SATURATION: 96 % | SYSTOLIC BLOOD PRESSURE: 120 MMHG | DIASTOLIC BLOOD PRESSURE: 80 MMHG

## 2023-01-31 VITALS — BODY MASS INDEX: 22.18 KG/M2 | WEIGHT: 122.04 LBS

## 2023-01-31 DIAGNOSIS — M25.552 PAIN IN LEFT HIP: ICD-10-CM

## 2023-01-31 PROCEDURE — 99213 OFFICE O/P EST LOW 20 MIN: CPT

## 2023-01-31 NOTE — HISTORY OF PRESENT ILLNESS
[FreeTextEntry8] : 89 y/o F PMHx HTN(Chlorthalidone), Bronchiectasis, HLD presents 2 office accompanied by her aide c/o 4 days of a swelling in left hip area. Ttok Liquid Ibuprofen and ice with some  improvement Denies injury or trauma

## 2023-01-31 NOTE — PHYSICAL EXAM
[Normal] : no acute distress, well nourished, well developed and well-appearing [de-identified] : TTP left hip no bruise no redness no swelling [de-identified] : Decrease ROM of LLE (hip rotatation)

## 2023-03-14 ENCOUNTER — APPOINTMENT (OUTPATIENT)
Dept: FAMILY MEDICINE | Facility: CLINIC | Age: 88
End: 2023-03-14
Payer: MEDICARE

## 2023-03-14 ENCOUNTER — NON-APPOINTMENT (OUTPATIENT)
Age: 88
End: 2023-03-14

## 2023-03-14 VITALS
DIASTOLIC BLOOD PRESSURE: 70 MMHG | OXYGEN SATURATION: 98 % | HEART RATE: 87 BPM | TEMPERATURE: 96.8 F | SYSTOLIC BLOOD PRESSURE: 118 MMHG | WEIGHT: 120.8 LBS | RESPIRATION RATE: 14 BRPM | BODY MASS INDEX: 21.95 KG/M2

## 2023-03-14 DIAGNOSIS — N28.9 DISORDER OF KIDNEY AND URETER, UNSPECIFIED: ICD-10-CM

## 2023-03-14 DIAGNOSIS — E78.5 HYPERLIPIDEMIA, UNSPECIFIED: ICD-10-CM

## 2023-03-14 LAB
25(OH)D3 SERPL-MCNC: 106 NG/ML
ALBUMIN SERPL ELPH-MCNC: 4.1 G/DL
ALP BLD-CCNC: 84 U/L
ALT SERPL-CCNC: 10 U/L
ANION GAP SERPL CALC-SCNC: 13 MMOL/L
AST SERPL-CCNC: 23 U/L
BASOPHILS # BLD AUTO: 0.03 K/UL
BASOPHILS NFR BLD AUTO: 0.3 %
BILIRUB SERPL-MCNC: 0.6 MG/DL
BUN SERPL-MCNC: 25 MG/DL
CALCIUM SERPL-MCNC: 10.1 MG/DL
CHLORIDE SERPL-SCNC: 98 MMOL/L
CHOLEST SERPL-MCNC: 174 MG/DL
CO2 SERPL-SCNC: 26 MMOL/L
CREAT SERPL-MCNC: 1.15 MG/DL
EGFR: 44 ML/MIN/1.73M2
EOSINOPHIL # BLD AUTO: 0.06 K/UL
EOSINOPHIL NFR BLD AUTO: 0.7 %
ESTIMATED AVERAGE GLUCOSE: 123 MG/DL
FOLATE SERPL-MCNC: >20 NG/ML
GLUCOSE SERPL-MCNC: 105 MG/DL
HBA1C MFR BLD HPLC: 5.9 %
HCT VFR BLD CALC: 39 %
HDLC SERPL-MCNC: 74 MG/DL
HGB BLD-MCNC: 12.5 G/DL
IMM GRANULOCYTES NFR BLD AUTO: 0.3 %
IRON SERPL-MCNC: 50 UG/DL
LDLC SERPL CALC-MCNC: 84 MG/DL
LYMPHOCYTES # BLD AUTO: 1.61 K/UL
LYMPHOCYTES NFR BLD AUTO: 17.5 %
MAN DIFF?: NORMAL
MCHC RBC-ENTMCNC: 30.7 PG
MCHC RBC-ENTMCNC: 32.1 GM/DL
MCV RBC AUTO: 95.8 FL
MONOCYTES # BLD AUTO: 1.18 K/UL
MONOCYTES NFR BLD AUTO: 12.8 %
NEUTROPHILS # BLD AUTO: 6.31 K/UL
NEUTROPHILS NFR BLD AUTO: 68.4 %
NONHDLC SERPL-MCNC: 100 MG/DL
PLATELET # BLD AUTO: 232 K/UL
POTASSIUM SERPL-SCNC: 4.1 MMOL/L
PROT SERPL-MCNC: 7.7 G/DL
RBC # BLD: 4.07 M/UL
RBC # FLD: 13.6 %
SODIUM SERPL-SCNC: 137 MMOL/L
TRIGL SERPL-MCNC: 81 MG/DL
TSH SERPL-ACNC: 2.29 UIU/ML
VIT B12 SERPL-MCNC: 1250 PG/ML
WBC # FLD AUTO: 9.22 K/UL

## 2023-03-14 PROCEDURE — 99214 OFFICE O/P EST MOD 30 MIN: CPT

## 2023-03-14 PROCEDURE — 81003 URINALYSIS AUTO W/O SCOPE: CPT | Mod: QW

## 2023-03-14 NOTE — HISTORY OF PRESENT ILLNESS
[de-identified] : 89 y/o F PMHx HTN(Chlorthalidone), Bronchiectasis, HLD presents 2 office accompanied by her son to review labs and f/up.Pt admits to having Norovirus 2 weeks ago . Felt very weak .

## 2023-03-16 ENCOUNTER — NON-APPOINTMENT (OUTPATIENT)
Age: 88
End: 2023-03-16

## 2023-03-16 LAB — BACTERIA UR CULT: NORMAL

## 2023-04-06 ENCOUNTER — NON-APPOINTMENT (OUTPATIENT)
Age: 88
End: 2023-04-06

## 2023-04-07 ENCOUNTER — TRANSCRIPTION ENCOUNTER (OUTPATIENT)
Age: 88
End: 2023-04-07

## 2023-04-11 ENCOUNTER — EMERGENCY (EMERGENCY)
Facility: HOSPITAL | Age: 88
LOS: 1 days | Discharge: HOME | End: 2023-04-11
Attending: EMERGENCY MEDICINE
Payer: MEDICARE

## 2023-04-11 VITALS
HEART RATE: 88 BPM | OXYGEN SATURATION: 96 % | RESPIRATION RATE: 20 BRPM | DIASTOLIC BLOOD PRESSURE: 81 MMHG | WEIGHT: 121.92 LBS | TEMPERATURE: 98 F | HEIGHT: 64 IN | SYSTOLIC BLOOD PRESSURE: 156 MMHG

## 2023-04-11 VITALS
RESPIRATION RATE: 18 BRPM | DIASTOLIC BLOOD PRESSURE: 75 MMHG | OXYGEN SATURATION: 94 % | SYSTOLIC BLOOD PRESSURE: 126 MMHG | TEMPERATURE: 98 F | HEART RATE: 75 BPM

## 2023-04-11 LAB
ALBUMIN SERPL ELPH-MCNC: 3.7 G/DL — SIGNIFICANT CHANGE UP (ref 3.3–5)
ALP SERPL-CCNC: 94 U/L — SIGNIFICANT CHANGE UP (ref 40–120)
ALT FLD-CCNC: 29 U/L — SIGNIFICANT CHANGE UP (ref 10–45)
ANION GAP SERPL CALC-SCNC: 11 MMOL/L — SIGNIFICANT CHANGE UP (ref 5–17)
APPEARANCE UR: CLEAR — SIGNIFICANT CHANGE UP
AST SERPL-CCNC: 36 U/L — SIGNIFICANT CHANGE UP (ref 10–40)
BACTERIA # UR AUTO: NEGATIVE — SIGNIFICANT CHANGE UP
BASOPHILS # BLD AUTO: 0.03 K/UL — SIGNIFICANT CHANGE UP (ref 0–0.2)
BASOPHILS NFR BLD AUTO: 0.5 % — SIGNIFICANT CHANGE UP (ref 0–2)
BILIRUB SERPL-MCNC: 0.5 MG/DL — SIGNIFICANT CHANGE UP (ref 0.2–1.2)
BILIRUB UR-MCNC: NEGATIVE — SIGNIFICANT CHANGE UP
BUN SERPL-MCNC: 17 MG/DL — SIGNIFICANT CHANGE UP (ref 7–23)
CALCIUM SERPL-MCNC: 9.4 MG/DL — SIGNIFICANT CHANGE UP (ref 8.4–10.5)
CHLORIDE SERPL-SCNC: 93 MMOL/L — LOW (ref 96–108)
CO2 SERPL-SCNC: 28 MMOL/L — SIGNIFICANT CHANGE UP (ref 22–31)
COLOR SPEC: SIGNIFICANT CHANGE UP
CREAT SERPL-MCNC: 0.87 MG/DL — SIGNIFICANT CHANGE UP (ref 0.5–1.3)
DIFF PNL FLD: NEGATIVE — SIGNIFICANT CHANGE UP
EGFR: 61 ML/MIN/1.73M2 — SIGNIFICANT CHANGE UP
EOSINOPHIL # BLD AUTO: 0.36 K/UL — SIGNIFICANT CHANGE UP (ref 0–0.5)
EOSINOPHIL NFR BLD AUTO: 6.3 % — HIGH (ref 0–6)
EPI CELLS # UR: 2 /HPF — SIGNIFICANT CHANGE UP
FLUAV AG NPH QL: SIGNIFICANT CHANGE UP
FLUBV AG NPH QL: SIGNIFICANT CHANGE UP
GLUCOSE SERPL-MCNC: 109 MG/DL — HIGH (ref 70–99)
GLUCOSE UR QL: NEGATIVE — SIGNIFICANT CHANGE UP
HCT VFR BLD CALC: 36.3 % — SIGNIFICANT CHANGE UP (ref 34.5–45)
HGB BLD-MCNC: 11.7 G/DL — SIGNIFICANT CHANGE UP (ref 11.5–15.5)
HYALINE CASTS # UR AUTO: 0 /LPF — SIGNIFICANT CHANGE UP (ref 0–2)
IMM GRANULOCYTES NFR BLD AUTO: 0.3 % — SIGNIFICANT CHANGE UP (ref 0–0.9)
KETONES UR-MCNC: NEGATIVE — SIGNIFICANT CHANGE UP
LEUKOCYTE ESTERASE UR-ACNC: NEGATIVE — SIGNIFICANT CHANGE UP
LIDOCAIN IGE QN: 75 U/L — HIGH (ref 7–60)
LYMPHOCYTES # BLD AUTO: 1.31 K/UL — SIGNIFICANT CHANGE UP (ref 1–3.3)
LYMPHOCYTES # BLD AUTO: 22.8 % — SIGNIFICANT CHANGE UP (ref 13–44)
MAGNESIUM SERPL-MCNC: 1.8 MG/DL — SIGNIFICANT CHANGE UP (ref 1.6–2.6)
MCHC RBC-ENTMCNC: 30.6 PG — SIGNIFICANT CHANGE UP (ref 27–34)
MCHC RBC-ENTMCNC: 32.2 GM/DL — SIGNIFICANT CHANGE UP (ref 32–36)
MCV RBC AUTO: 95 FL — SIGNIFICANT CHANGE UP (ref 80–100)
MONOCYTES # BLD AUTO: 0.8 K/UL — SIGNIFICANT CHANGE UP (ref 0–0.9)
MONOCYTES NFR BLD AUTO: 13.9 % — SIGNIFICANT CHANGE UP (ref 2–14)
NEUTROPHILS # BLD AUTO: 3.22 K/UL — SIGNIFICANT CHANGE UP (ref 1.8–7.4)
NEUTROPHILS NFR BLD AUTO: 56.2 % — SIGNIFICANT CHANGE UP (ref 43–77)
NITRITE UR-MCNC: NEGATIVE — SIGNIFICANT CHANGE UP
NRBC # BLD: 0 /100 WBCS — SIGNIFICANT CHANGE UP (ref 0–0)
PH UR: 7.5 — SIGNIFICANT CHANGE UP (ref 5–8)
PLATELET # BLD AUTO: 184 K/UL — SIGNIFICANT CHANGE UP (ref 150–400)
POTASSIUM SERPL-MCNC: 4 MMOL/L — SIGNIFICANT CHANGE UP (ref 3.5–5.3)
POTASSIUM SERPL-SCNC: 4 MMOL/L — SIGNIFICANT CHANGE UP (ref 3.5–5.3)
PROT SERPL-MCNC: 7.3 G/DL — SIGNIFICANT CHANGE UP (ref 6–8.3)
PROT UR-MCNC: NEGATIVE — SIGNIFICANT CHANGE UP
RBC # BLD: 3.82 M/UL — SIGNIFICANT CHANGE UP (ref 3.8–5.2)
RBC # FLD: 13.8 % — SIGNIFICANT CHANGE UP (ref 10.3–14.5)
RBC CASTS # UR COMP ASSIST: 1 /HPF — SIGNIFICANT CHANGE UP (ref 0–4)
RSV RNA NPH QL NAA+NON-PROBE: SIGNIFICANT CHANGE UP
SARS-COV-2 RNA SPEC QL NAA+PROBE: SIGNIFICANT CHANGE UP
SODIUM SERPL-SCNC: 132 MMOL/L — LOW (ref 135–145)
SP GR SPEC: 1.01 — LOW (ref 1.01–1.02)
UROBILINOGEN FLD QL: NEGATIVE — SIGNIFICANT CHANGE UP
WBC # BLD: 5.74 K/UL — SIGNIFICANT CHANGE UP (ref 3.8–10.5)
WBC # FLD AUTO: 5.74 K/UL — SIGNIFICANT CHANGE UP (ref 3.8–10.5)
WBC UR QL: 2 /HPF — SIGNIFICANT CHANGE UP (ref 0–5)

## 2023-04-11 PROCEDURE — 87186 SC STD MICRODIL/AGAR DIL: CPT

## 2023-04-11 PROCEDURE — 80053 COMPREHEN METABOLIC PANEL: CPT

## 2023-04-11 PROCEDURE — 99284 EMERGENCY DEPT VISIT MOD MDM: CPT | Mod: 25

## 2023-04-11 PROCEDURE — 99284 EMERGENCY DEPT VISIT MOD MDM: CPT

## 2023-04-11 PROCEDURE — 87077 CULTURE AEROBIC IDENTIFY: CPT

## 2023-04-11 PROCEDURE — 85025 COMPLETE CBC W/AUTO DIFF WBC: CPT

## 2023-04-11 PROCEDURE — 83735 ASSAY OF MAGNESIUM: CPT

## 2023-04-11 PROCEDURE — 74177 CT ABD & PELVIS W/CONTRAST: CPT | Mod: MA

## 2023-04-11 PROCEDURE — 81001 URINALYSIS AUTO W/SCOPE: CPT

## 2023-04-11 PROCEDURE — 87637 SARSCOV2&INF A&B&RSV AMP PRB: CPT

## 2023-04-11 PROCEDURE — 74177 CT ABD & PELVIS W/CONTRAST: CPT | Mod: 26,MA

## 2023-04-11 PROCEDURE — 83690 ASSAY OF LIPASE: CPT

## 2023-04-11 PROCEDURE — 87086 URINE CULTURE/COLONY COUNT: CPT

## 2023-04-11 PROCEDURE — 96374 THER/PROPH/DIAG INJ IV PUSH: CPT | Mod: XU

## 2023-04-11 RX ORDER — SODIUM CHLORIDE 9 MG/ML
1000 INJECTION INTRAMUSCULAR; INTRAVENOUS; SUBCUTANEOUS ONCE
Refills: 0 | Status: COMPLETED | OUTPATIENT
Start: 2023-04-11 | End: 2023-04-11

## 2023-04-11 RX ORDER — ACETAMINOPHEN 500 MG
1000 TABLET ORAL ONCE
Refills: 0 | Status: COMPLETED | OUTPATIENT
Start: 2023-04-11 | End: 2023-04-11

## 2023-04-11 RX ADMIN — SODIUM CHLORIDE 1000 MILLILITER(S): 9 INJECTION INTRAMUSCULAR; INTRAVENOUS; SUBCUTANEOUS at 14:47

## 2023-04-11 RX ADMIN — Medication 400 MILLIGRAM(S): at 14:47

## 2023-04-11 NOTE — ED ADULT NURSE NOTE - OBJECTIVE STATEMENT
89 yo female with a PMH of HLD presents to the ED ambulatory with walker complaining of abdominal pain. Son at bedside reports patient went to  on Friday due to dysuria and was prescribed macrobid for a UTI which patient is still currently taking. Reports that she has been having lower pelvic abdominal pain and has had decreased PO intake. Patient reports her LBM was last night but was hard and a little amount. Abdomen is soft, tender throughout lower abdominal area. Patient is otherwise well appearing at this time. Denies headache, dizziness, vision changes, chest pain, shortness of breath, nausea, vomiting, diarrhea, fevers, chills, hematuria, recent illness travel or fall.

## 2023-04-11 NOTE — ED ADULT NURSE NOTE - HIV OFFER
PA request submitted via Covermymeds for Name Brand Metformin. PA Approved, pharmacy notified.
Opt out

## 2023-04-11 NOTE — ED PROVIDER NOTE - DISCHARGE DATE
11-Apr-2023

## 2023-04-11 NOTE — ED PROVIDER NOTE - NSFOLLOWUPCLINICS_GEN_ALL_ED_FT
Gastroenterology at [facility]  Gastroenterology  [address] [place] [id number]  Phone: [phone]  Fax:

## 2023-04-11 NOTE — ED PROVIDER NOTE - IV ALTEPLASE EXCL ABS HIDDEN
"RX PROGRESS NOTE: Vancomycin Therapeutic Drug Monitoring      Indication for therapy: Osteomyelitis    ALLERGIES:  No Known Allergies    Most recent height and weight information:  Weight: 57.4 kg (05/13/19 0546)  Height: 4' 11"" (149.9 cm) (05/13/19 1100)    The Following are the calculated  Current Weights for Dedrick Brooke            Adjusted Ideal    49kg 44.3kg             Labs:  Serum Creatinine and Creatinine Clearance:  5/13/19 SCr = 0.53  CrCl ~ 114mL/min    Maximum Temperature (last 24 hours)     Value Max    Temp  98.18 Â°F (36.8 Â°C)        WBC (K/mcL)   Date/Time Value   05/13/2019 0706 26.3 (H)   05/12/2019 1615 22.9 (H)     Microbiology Results  (Last 10 results in the past 7 days)    Specimen   Gram Smear   Culture Result   Status      05/12/19  2330   05/12/19  2330  05/12/19  2330     BLOOD HAND, LEFT   PENDING PENDING    05/12/19  2310   05/12/19  2310  05/12/19  2310     BLOOD WRIST, LEFT   PENDING PENDING            Assessment/Plan:  Briefly, this is a 25year old female started on vancomycin for Osteomyelitis, with a target serum trough concentration of 12-18 mcg/mL. Initial dosing regimen will be 1250 mg loading dose once, followed by a maintenance dose of 1000 mg every 12 hours. .    Pharmacy will monitor levels as appropriate. Pharmacy will continue to monitor patient (renal function, microbiology data, risk factors for adverse events, appropriate duration of therapy), will order/monitor serum levels as appropriate, and will adjust dose if/when necessary.       Thank you,    Rakan Us, [facility]  5/13/2019 2:07 PM    " show

## 2023-04-11 NOTE — ED ADULT TRIAGE NOTE - IDEAL BODY WEIGHT(KG)
2020       Maureen Posey, CNM  [address] [place] [address]  [facility] [id number]  VIA In Basket      Patient: Yobani Gillespie   YOB: 1995   Date of Visit: 2020       Dear Dr. Posey:    Thank you for referring Yobani Gillespie to me for evaluation. Below are my notes for this visit with her.    If you have questions, please do not hesitate to call me. I look forward to following your patient along with you.      Sincerely,        Judit Perry MD        CC: No Recipients  Judit Perry MD  2020  1:05 PM  Sign when Signing Visit   Cardiology Consultation    Referring Physician:  Maureen Posey    History of Present Illness:   Yobani Gillespie is a 24 Yrs  female at 33.3 weeks gestation, here for initial consultation due to fetal renal agenesis and antepartum diabetes.    Patient Active Problem List   Diagnosis   • Type 2 diabetes mellitus complicating pregnancy, antepartum   • Supervision of high risk pregnancy, antepartum   • Abnormal ultrasound- L fetal kidney agensis   • Noncompliant pregnant patient, antepartum   • Polyhydramnios       Past Medical History:   Diagnosis Date   • Anemia    • Diabetes mellitus (CMS/HCC)        Past Surgical History:   Procedure Laterality Date   • No past surgeries         Family History   Problem Relation Age of Onset   • Diabetes Father    • Stroke Father    • Diabetes Paternal Grandfather    • Hypertension Mother       There is no known family history of congenital heart disease.    Social History     Patient does not qualify to have social determinant information on file (likely too young).   Social History Narrative   • Not on file       Current Outpatient Medications   Medication Sig Dispense Refill   • acetaminophen (TYLENOL) 325 MG tablet Take 650 mg by mouth every 4 hours as needed for Pain.     • Insulin Lispro, 1 Unit Dial, (INSULIN LISPRO) 100 UNIT/ML pen-injector Inject 12 Units into the skin 3 times daily (before  meals). May use Admelog pen. Also correction dose of  2-6 units up to 4 times a day. 15 mL 4   • insulin glargine (LANTUS SOLOSTAR, BASAGLAR) 100 UNIT/ML pen-injector Inject 32 Units into the skin nightly. Use Basaglar pen. Prime 2 units before each dose. 15 mL 4   • aspirin (ASPIRIN 81) 81 MG EC tablet Take 1 tablet by mouth daily. 90 tablet 4   • Prenatal Vit-Fe Fumarate-FA (MULTIVITAMIN & MINERAL W/FOLIC ACID- PRENATAL) 27-1 MG Tab Take 1 tablet by mouth daily.     • ONETOUCH VERIO test strip Test blood sugar 4 times daily as directed. Diagnosis: Type dm in pregnancy. Meter: One Touch Verio 150 strip 4   • ONETOUCH DELICA LANCETS 33G Misc Check blood glucose 4 times a day. 100 each 4   • Insulin Pen Needle 32G X 4 MM Misc Use to inject insulin  4 times daily. Remove needle cover(s) to expose needle before injecting. 200 each 4     No current facility-administered medications for this visit.        ALLERGIES:  No Known Allergies    FETAL ECHOCARDIOGRAM SUMMARY:   IMPRESSION:  Structurally normal fetal heart with normal rhythm. As with any fetal echocardiogram, minor valve abnormalities, small ventricular septal defects, coarctation of the aorta, and partial anomalous pulmonary venous return cannot absolutely be excluded.    DETAILED REPORT:  Situs solitus.  Levocardia with normal intracardiac connections.  Normal return of the superior and inferior vena cavae to the right atrium.  Normal right atrial size.  Normal tricuspid valve structure and function.  Qualitatively normal right ventricular size and systolic function, with no hypertrophy.  Normal pulmonary valve structure and function.  Normal size and flow velocities in the branch pulmonary arteries.  Normal return of at least one pulmonary vein to the left atrium.  Normal left atrial size.  Normal mitral valve structure and function.  Qualitatively normal left ventricular size and systolic function.  No apparent left ventricular hypertrophy.   No large  ventricular septal defects.  Normal aortic valve function.  Number of leaflets cannot be confirmed.  Normal structure and flow velocities of the aortic arch.  Normal right to left shunting across the foramen ovale and ductus arteriosus.  No pericardial or pleural effusions.    Appropriate heart rate for gestational age at 155 bpm, with normal mechanical NJ interval of 104 msec.   1:1 AV conduction documented by simultaneous mitral and aortic valve Doppler.  No arrhythmias during examination.       PATIENT COUNSELING:    I counseled the patient regarding the indications, prompting this examination, reviewed the results of the study, its limitation and indications.  I have indicated to the patient that maternal antepartum diabetes or early gestational diabetes are risk factors for fetal congenital heart disease and an indication of fetal echocardiogram.  This risk is estimated at 5% to 15%(Arnaldo et al.  Congenital heart disease in infants of diabetic mothers: Echocardiographic study.  Pediatric cardiology: 2004, volume 25, issue 2, 137-140).  This confers a higher risk when compared to the general population to have a baby with congenital heart disease.  The type of defect is variable, ranging from small ventricular septal defects to transposition of the great arteries.  The patient has also been informed that there is a risk of cardiac hypertrophy if the blood sugars are uncontrolled, especially in the second and third trimesters.  She voiced understanding.     General limitations of fetal cardiac ultrasound were also reviewed.  These include, but are not limited to, the inability to exclude patent ductus arteriosus ( persistent), some atrial and ventricular septal defects; some arterial anomalies including aortic coarctation; some pulmonary or systemic venous anomaly; coronary artery anomaly; and minor valve abnormalities.  Nevertheless, I explained that this echocardiogram significantly reduces  the risk of complex congenital heart disease.    RECOMMENDATIONS:    1. A follow-up fetal echocardiogram is not indicated; however, the exam may be repeated in the early 3rd trimester if there has been poor blood glucose control or if there are any new or persistent clinical concerns.  2.  echocardiogram is not indicated unless CHD is clinically suspected (murmur, hypoxia, acidosis, premature infant with concern for PDA, abnormal  screen, etc).     I met with Ms. Yobani Gillespie. The total length of time of the encounter was 25 minutes, with greater than half of that time spent counseling the patient and/or coordinating care.      Judit Perry MD  Pediatric Cardiology  [facility]              55

## 2023-04-11 NOTE — ED PROVIDER NOTE - ATTENDING CONTRIBUTION TO CARE
Patient is a 89-year-old female being treated for UTI on Macrobid several days now presenting with lower abdominal pain with constipation the last several days decreased p.o. intake no nausea vomiting no fevers.  Patient is well-appearing mild lower abdominal pain history of appendectomy could be cystitis due to UTI no flank pain no fever vital stable.  To scan the belly to look for causes of her pain check her urine and labs

## 2023-04-11 NOTE — ED PROVIDER NOTE - PROGRESS NOTE DETAILS
No acute abdominal pathology. No ttp on abdominal exam. Tolerating PO intake. Patient to be discharged with return precautions, verbalizes understanding.  -Baltazar Dennis PGY-1

## 2023-04-11 NOTE — ED PROVIDER NOTE - IV ALTEPLASE INCLUSION HIDDEN
In the next few weeks you may receive a Press Ganey survey regarding your most recent clinic visit with us.  Please take a few moments to accurately evaluate your visit.  We strive for excellence and value your feedback.       If we need to contact you regarding any test results, we will make 2 attempts to reach you at the number you have listed during your office visit today.  If we are unable to reach you, a letter with your results and any further instructions will be mailed to you home.    Please do not hesitate to call our office with any questions or concerns at Dept: [phone].    
show

## 2023-04-11 NOTE — ED PROVIDER NOTE - PHYSICAL EXAMINATION
GEN: Patient awake alert NAD.   HEENT: normocephalic, atraumatic, EOMI, no scleral icterus, moist MM  CARDIAC: RRR, S1, S2, no murmur.   PULM: CTA B/L no wheeze, rhonchi, rales.   ABD: lower abdominal ttp, epigastric ttp, no rebound or gaurding. no CVA tenderness  MSK: Moving all extremities, no edema. 5/5 strength and full ROM in all extremities.     NEURO: A&Ox3, gait normal, no focal neurological deficits, CN 2-12 grossly intact  SKIN: warm, dry, no rash. GEN: Patient awake alert NAD.   HEENT: normocephalic, atraumatic, EOMI, no scleral icterus, moist MM  CARDIAC: RRR, S1, S2, no murmur.   PULM: CTA B/L no wheeze, rhonchi, rales.   ABD: diffuse lower abdominal ttp, epigastric ttp (chronic), no rebound or gaurding. no CVA tenderness  MSK: Moving all extremities, no edema. 5/5 strength and full ROM in all extremities.     NEURO: A&Ox3, gait normal, no focal neurological deficits, CN 2-12 grossly intact  SKIN: warm, dry, no rash.

## 2023-04-11 NOTE — ED PROVIDER NOTE - NSFOLLOWUPINSTRUCTIONS_ED_ALL_ED_FT
-You were seen in the Emergency Department (ED) for abdominal pain. Lab and imaging results are attached.    FOLLOW-UP:  -Please follow up with your primary doctor if symptoms return or for any concerning matter pertaining to your abdominal pain.  -A referral for GI was also sent. Contact information is below.    MEDICATIONS:  -Continue all other prescribed medicine, IF ANY, as per your primary care doctor's (PMD) recommendations.    PAIN CONTROL:  -Please take over the counter Tylenol (also known as acetaminophen) 650mg every 6 hours or Ibuprofen (also known as motrin, advil) 600mg every 8 hour for your pain, IF ANY, unless you are not supposed to for any reason.  -Rest, stay hydrated with plenty of fluids (drink at least 2 Liters or 64 Ounces of water each day UNLESS you are supposed to restrict fluids or ANY reason.    RETURN PRECAUTIONS:  -Please return to the Emergency Department if you experience ANY new or concerning symptoms, such as, but not limited to: worsening pain, large amount of bleeding, passing out, fever >100.4F, shaking chills, inability to see or new double vision, chest pain, difficulty breathing, unable to eat or drink, continuous vomiting or diarrhea, unable to move or feel part of your body

## 2023-04-11 NOTE — ED ADULT NURSE NOTE - AS PAIN REST
Labor Progress Note    S: Patient is feeling contractions more and more intensely. Thinks her bag of water broke, too. Amenable to cervical exam and FSE placement as tracing has been discontinuous with some decels. Also discussed in conjunction with anesthesia team recommendation for epidural as soon as possible, given low starting Hgb, possible c/f abruption, and high bleeding risk for a  under GETA.    O:   Patient Vitals for the past 24 hrs:   BP Temp Temp src Resp   22 2223 111/70 98  F (36.7  C) Oral 18   22 1948 124/81 98.1  F (36.7  C) Oral 18        Gen: Breathing through contractions, shaky  SVE: 3/80/-1 (2326)  - Exam chaperoned by RN and staff    Membranes: SROM (~2320), clear    FHR Baseline 140, moderate variability, accels present, intermittent decels (difficult to characterize relationship to contractions given discontinuous tracing  Eunola: 3 contractions in 10 minutes    A/P:  Joann Atkinson is a 36 year old  at 39w0d admitted for spontaneous labor. Also having vaginal bleeding possibly concerning for abruption. 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    # Spontaneous Labor  - Continue to observe  - Augmentation with pit prn  - Pain: getting Epidural now per Anesthesia team recommendation  - GBS negative, antibiotics not indicated    # Moran   - Hgb 8.6, T&C x2u pRBCs  - Planning early Epidural as above    # Fetal well being  - Moderate variability, reactive  - Continuous fetal monitoring  - Intrauterine resuscitative measures PRN  - EFW 7 pounds, Rh positive, Placenta posterior    # PPH Risk: High - IV, T&C    Paola William MD, PGY-2  11:44 PM  [facility]       6 (moderate pain)

## 2023-04-11 NOTE — ED PROVIDER NOTE - PATIENT PORTAL LINK FT
You can access the FollowMyHealth Patient Portal offered by [facility] by registering at the following website: [email or web address]. By joining [facility]’s FollowMyHealth portal, you will also be able to view your health information using other applications (apps) compatible with our system.

## 2023-04-11 NOTE — ED PROVIDER NOTE - OBJECTIVE STATEMENT
89-year-old female history of hyperlipidemia, appendectomy, recent UTI currently on Macrobid presents with months with 3 days of progressively worsening lower abdominal pain, states the pain became constant and more severe last night.  Endorses nausea, decreased appetite, no vomiting. 89-year-old female history of hyperlipidemia, appendectomy, recent UTI currently on Macrobid presents with 3 days of progressively worsening lower abdominal pain, states the pain became constant and more severe last night.  Endorses nausea, decreased appetite, no vomiting.

## 2023-04-12 ENCOUNTER — TRANSCRIPTION ENCOUNTER (OUTPATIENT)
Age: 88
End: 2023-04-12

## 2023-04-13 NOTE — ED POST DISCHARGE NOTE - DETAILS
4/15: Spoke to patient and her family and advised that culture revealing resistance to Macrobid. Recommended patient take Keflex instead, pt prefers liquid abx. 5 day course sent to preferred pharmacy. All questions addressed - Marjan Pichardo PA-C

## 2023-04-14 LAB
-  AMIKACIN: SIGNIFICANT CHANGE UP
-  AMOXICILLIN/CLAVULANIC ACID: SIGNIFICANT CHANGE UP
-  AMPICILLIN/SULBACTAM: SIGNIFICANT CHANGE UP
-  AMPICILLIN: SIGNIFICANT CHANGE UP
-  AZTREONAM: SIGNIFICANT CHANGE UP
-  CEFAZOLIN: SIGNIFICANT CHANGE UP
-  CEFEPIME: SIGNIFICANT CHANGE UP
-  CEFOXITIN: SIGNIFICANT CHANGE UP
-  CEFTRIAXONE: SIGNIFICANT CHANGE UP
-  CEFUROXIME: SIGNIFICANT CHANGE UP
-  CIPROFLOXACIN: SIGNIFICANT CHANGE UP
-  ERTAPENEM: SIGNIFICANT CHANGE UP
-  GENTAMICIN: SIGNIFICANT CHANGE UP
-  LEVOFLOXACIN: SIGNIFICANT CHANGE UP
-  MEROPENEM: SIGNIFICANT CHANGE UP
-  NITROFURANTOIN: SIGNIFICANT CHANGE UP
-  PIPERACILLIN/TAZOBACTAM: SIGNIFICANT CHANGE UP
-  TOBRAMYCIN: SIGNIFICANT CHANGE UP
-  TRIMETHOPRIM/SULFAMETHOXAZOLE: SIGNIFICANT CHANGE UP
CULTURE RESULTS: SIGNIFICANT CHANGE UP
METHOD TYPE: SIGNIFICANT CHANGE UP
ORGANISM # SPEC MICROSCOPIC CNT: SIGNIFICANT CHANGE UP
ORGANISM # SPEC MICROSCOPIC CNT: SIGNIFICANT CHANGE UP
SPECIMEN SOURCE: SIGNIFICANT CHANGE UP

## 2023-04-15 ENCOUNTER — TRANSCRIPTION ENCOUNTER (OUTPATIENT)
Age: 88
End: 2023-04-15

## 2023-04-15 RX ORDER — CEPHALEXIN 500 MG
10 CAPSULE ORAL
Qty: 1 | Refills: 0
Start: 2023-04-15 | End: 2023-04-19

## 2023-04-16 ENCOUNTER — TRANSCRIPTION ENCOUNTER (OUTPATIENT)
Age: 88
End: 2023-04-16

## 2023-05-01 ENCOUNTER — RX RENEWAL (OUTPATIENT)
Age: 88
End: 2023-05-01

## 2023-05-02 ENCOUNTER — APPOINTMENT (OUTPATIENT)
Dept: FAMILY MEDICINE | Facility: CLINIC | Age: 88
End: 2023-05-02
Payer: MEDICARE

## 2023-05-02 VITALS
TEMPERATURE: 96.8 F | OXYGEN SATURATION: 99 % | RESPIRATION RATE: 14 BRPM | DIASTOLIC BLOOD PRESSURE: 82 MMHG | HEART RATE: 81 BPM | SYSTOLIC BLOOD PRESSURE: 128 MMHG

## 2023-05-02 DIAGNOSIS — R10.12 LEFT UPPER QUADRANT PAIN: ICD-10-CM

## 2023-05-02 DIAGNOSIS — R10.32 LEFT LOWER QUADRANT PAIN: ICD-10-CM

## 2023-05-02 DIAGNOSIS — R93.89 ABNORMAL FINDINGS ON DIAGNOSTIC IMAGING OF OTHER SPECIFIED BODY STRUCTURES: ICD-10-CM

## 2023-05-02 PROCEDURE — 99215 OFFICE O/P EST HI 40 MIN: CPT

## 2023-05-02 RX ORDER — CHLORTHALIDONE 25 MG/1
25 TABLET ORAL DAILY
Qty: 90 | Refills: 1 | Status: COMPLETED | COMMUNITY
Start: 2018-05-04 | End: 2023-05-02

## 2023-05-02 NOTE — HISTORY OF PRESENT ILLNESS
[FreeTextEntry8] : 89 y/o F presents accompanied fayenher son .Pt c/o lower abdominal pain for several days. Pt treated for UTI in ER severall weeks ago. On Abx . According to son patients Abx changed for resistant to bacteria in UTI. Pt also had been on UTI sveral weeks prior for dental infection. Pt admits  immediately after eating needed to defecate. Loose stools. Dark at times.  Finished Abx 4/14 no longer has UTI sx. No fever. However still has frequency of uriantion. Not sleeping . + Nocturia. Stopped Chlorthalidone several months ago. Presentted to ER last week for lower abdominal pain (L>R) CT abd/pelvis Neg  . Saw Gastro (according to patient and son "did nothing". Continues to have lower abdominal pain and decreased appetite, thin stools dark.

## 2023-05-02 NOTE — PHYSICAL EXAM
[Normal] : affect was normal and insight and judgment were intact [de-identified] : TTP LUq, LLQ No masses soift +BS

## 2023-05-02 NOTE — REVIEW OF SYSTEMS
[Abdominal Pain] : abdominal pain [Diarrhea] : diarrhea [Nocturia] : nocturia [Frequency] : frequency [Negative] : Psychiatric

## 2023-05-04 ENCOUNTER — TRANSCRIPTION ENCOUNTER (OUTPATIENT)
Age: 88
End: 2023-05-04

## 2023-05-08 ENCOUNTER — NON-APPOINTMENT (OUTPATIENT)
Age: 88
End: 2023-05-08

## 2023-05-08 LAB
BACTERIA UR CULT: NORMAL
C DIFF TOX B STL QL CT TISS CULT: NORMAL
Lab: NORMAL

## 2023-05-09 ENCOUNTER — OUTPATIENT (OUTPATIENT)
Dept: OUTPATIENT SERVICES | Facility: HOSPITAL | Age: 88
LOS: 1 days | End: 2023-05-09
Payer: MEDICARE

## 2023-05-09 ENCOUNTER — APPOINTMENT (OUTPATIENT)
Dept: CT IMAGING | Facility: IMAGING CENTER | Age: 88
End: 2023-05-09
Payer: MEDICARE

## 2023-05-09 DIAGNOSIS — R93.89 ABNORMAL FINDINGS ON DIAGNOSTIC IMAGING OF OTHER SPECIFIED BODY STRUCTURES: ICD-10-CM

## 2023-05-09 PROCEDURE — 71250 CT THORAX DX C-: CPT

## 2023-05-09 PROCEDURE — 71250 CT THORAX DX C-: CPT | Mod: 26

## 2023-05-10 ENCOUNTER — NON-APPOINTMENT (OUTPATIENT)
Age: 88
End: 2023-05-10

## 2023-05-15 ENCOUNTER — APPOINTMENT (OUTPATIENT)
Dept: GASTROENTEROLOGY | Facility: CLINIC | Age: 88
End: 2023-05-15
Payer: MEDICARE

## 2023-05-15 VITALS
DIASTOLIC BLOOD PRESSURE: 93 MMHG | HEART RATE: 86 BPM | BODY MASS INDEX: 19.97 KG/M2 | SYSTOLIC BLOOD PRESSURE: 174 MMHG | WEIGHT: 117 LBS | HEIGHT: 64 IN

## 2023-05-15 DIAGNOSIS — R15.9 FULL INCONTINENCE OF FECES: ICD-10-CM

## 2023-05-15 DIAGNOSIS — R19.8 OTHER SPECIFIED SYMPTOMS AND SIGNS INVOLVING THE DIGESTIVE SYSTEM AND ABDOMEN: ICD-10-CM

## 2023-05-15 PROCEDURE — 82272 OCCULT BLD FECES 1-3 TESTS: CPT

## 2023-05-15 PROCEDURE — 99215 OFFICE O/P EST HI 40 MIN: CPT | Mod: 25

## 2023-05-15 RX ORDER — CEPHALEXIN 250 MG/5ML
250 FOR SUSPENSION ORAL
Qty: 100 | Refills: 0 | Status: DISCONTINUED | COMMUNITY
Start: 2023-04-15

## 2023-05-15 RX ORDER — PHENAZOPYRIDINE HYDROCHLORIDE 200 MG/1
200 TABLET ORAL
Qty: 6 | Refills: 0 | Status: DISCONTINUED | COMMUNITY
Start: 2023-04-07

## 2023-05-15 RX ORDER — AMOXICILLIN 500 MG/1
500 TABLET, FILM COATED ORAL
Qty: 25 | Refills: 0 | Status: DISCONTINUED | COMMUNITY
Start: 2023-03-20

## 2023-05-15 RX ORDER — PREDNISONE 10 MG/1
10 TABLET ORAL
Qty: 16 | Refills: 0 | Status: DISCONTINUED | COMMUNITY
Start: 2022-03-25 | End: 2023-05-15

## 2023-05-15 RX ORDER — LIDOCAINE 5% 700 MG/1
5 PATCH TOPICAL
Qty: 1 | Refills: 1 | Status: DISCONTINUED | COMMUNITY
Start: 2023-01-31 | End: 2023-05-15

## 2023-05-15 NOTE — REVIEW OF SYSTEMS
[Feeling Tired] : feeling tired [Recent Weight Loss (___ Lbs)] : recent [unfilled] ~Ulb weight loss [Abdominal Pain] : abdominal pain [Melena (black stool)] : melena [Negative] : Heme/Lymph [Shortness Of Breath] : shortness of breath [Cough] : cough [As Noted in HPI] : as noted in HPI

## 2023-05-15 NOTE — PHYSICAL EXAM
[No Acute Distress] : no acute distress [Well Developed] : well developed [Well Nourished] : well nourished [Heart Rate And Rhythm] : heart rate was normal and rhythm regular [Abdomen Tenderness] : non-tender [No Masses] : no abdominal mass palpated [] : no hepatosplenomegaly [No Hernia] : no hernia [Hyperactive] : hyperactive [Inguinal Lymph Nodes Enlarged Bilaterally] : no inguinal lymphadenopathy [Normal Color / Pigmentation] : normal skin color and pigmentation [Normal] : oriented to person, place, and time [Occult Blood] : negative occult blood [de-identified] : scattered crackles, rhonchi; decreased BS at bases [de-identified] : 2/6SM [de-identified] : trace ankle edema; LE varicose veins [de-identified] : softly distended [de-identified] : uses cane

## 2023-05-15 NOTE — CONSULT LETTER
[Dear  ___] : Dear  [unfilled], [Courtesy Letter:] : I had the pleasure of seeing your patient, [unfilled], in my office today. [Please see my note below.] : Please see my note below. [Consult Closing:] : Thank you very much for allowing me to participate in the care of this patient.  If you have any questions, please do not hesitate to contact me. [Sincerely,] : Sincerely, [FreeTextEntry3] : Dung Rodriguez M.D.\par

## 2023-05-15 NOTE — REASON FOR VISIT
[Follow-up] : a follow-up of an existing diagnosis [Other: _____] : [unfilled] [FreeTextEntry1] : Lower abdominal pain

## 2023-05-15 NOTE — HISTORY OF PRESENT ILLNESS
[FreeTextEntry1] : Shadi returns because of frequent lower abdominal pain, altered bowels, decreased appetite, and gradual weight loss.  She has difficulty sleeping at night because of so much gas.  Her sons say that she "eats like a bird."  She has been experiencing postprandial urgency, abdominal pain either during or after meals, with small pieces of stool and some fecal incontinence.  She has felt a bit better since starting hyoscyamine.  She has not yet tried the Gas-X that was recommended.  Pepto-Bismol did not help.  She mentioned that she is somewhat more short of breath than prior, with nonprogressive chronic cough, which seem to improve with Freedom-DM.  She had received antibiotics for UTI in early spring, subsequently noted more abdominal pain.  Stool for C. difficile was negative.  CT scan abdomen/pelvis 4/11/2023 revealed groundglass opacity RML, bilateral trace effusions, diffuse ASHD, and DJD of the spine, without obvious intestinal obstruction, bowel thickening, or guido malignancy.  She has lost approximately 10 pounds since last visit here.

## 2023-05-15 NOTE — ASSESSMENT
[FreeTextEntry1] : 1.  Frequent lower abdominal pain, gassiness, change in bowels, weight loss--I am concerned about chronic mesenteric ischemia.  Evidence of biventricular CHF (and perhaps pulmonary fibrosis) on today's exam, coupled with diffuse ASHD on recent CT scan.  Certainly, other entities, such as microscopic colitis or pancreatic insufficiency, are also possible.\par 2.  History of colonic polyp.\par 3.  Hypertension.\par 4.  Hyperlipidemia.\par 5.  History of anxiety/depression.\par 6.  DJD of spine.\par 7.  History of DVT.\par 8.  Chronic renal insufficiency, stage III.\par 9.  Hyperglobulinemia of unclear significance.\par \par Plan:\par 1.  Interim medical records reviewed.  Await results of recent chest CT.\par 2.  Consider Echocardiogram to better assess cardiac function.\par 3.  Ordinarily, colonoscopy would be advisable.  However, I have no plans for colonoscopy in this clinical scenario, as I suspect that either the bowel prep and/or anesthesia could hasten her demise.\par 4.  More frequent smaller meals, with salt restriction. Foods that cause more gas were reviewed. \par 5.  Try Benefiber 1 tablespoon daily; can increase to twice daily dosing after 1-2 weeks if needed.\par 6.  Can also try low-dose Imodium if bowels are problematic.\par 7.  Follow-up with Pulmonary & Cardiology.

## 2023-05-17 ENCOUNTER — TRANSCRIPTION ENCOUNTER (OUTPATIENT)
Age: 88
End: 2023-05-17

## 2023-05-24 ENCOUNTER — NON-APPOINTMENT (OUTPATIENT)
Age: 88
End: 2023-05-24

## 2023-05-24 ENCOUNTER — APPOINTMENT (OUTPATIENT)
Dept: CARDIOLOGY | Facility: CLINIC | Age: 88
End: 2023-05-24
Payer: MEDICARE

## 2023-05-24 VITALS
WEIGHT: 118 LBS | SYSTOLIC BLOOD PRESSURE: 158 MMHG | DIASTOLIC BLOOD PRESSURE: 80 MMHG | BODY MASS INDEX: 20.25 KG/M2 | HEART RATE: 77 BPM | OXYGEN SATURATION: 97 %

## 2023-05-24 DIAGNOSIS — R53.83 OTHER FATIGUE: ICD-10-CM

## 2023-05-24 DIAGNOSIS — R60.0 LOCALIZED EDEMA: ICD-10-CM

## 2023-05-24 DIAGNOSIS — Z87.898 PERSONAL HISTORY OF OTHER SPECIFIED CONDITIONS: ICD-10-CM

## 2023-05-24 DIAGNOSIS — R19.7 DIARRHEA, UNSPECIFIED: ICD-10-CM

## 2023-05-24 DIAGNOSIS — R06.02 SHORTNESS OF BREATH: ICD-10-CM

## 2023-05-24 PROCEDURE — 93000 ELECTROCARDIOGRAM COMPLETE: CPT

## 2023-05-24 PROCEDURE — 99204 OFFICE O/P NEW MOD 45 MIN: CPT | Mod: 25

## 2023-05-24 NOTE — DISCUSSION/SUMMARY
[FreeTextEntry1] :  1.  I reassured the patient that I thought her cardiac status was relatively stable\par  2.  2D echo to evaluate LV RV function and rule out valvular disease\par  3.  Careful follow-up of blood pressure\par \par  overall this patient is quite amazing at age 89 fully mentally intact with a good quality of life.  The GI issues seem to be resolving [EKG obtained to assist in diagnosis and management of assessed problem(s)] : EKG obtained to assist in diagnosis and management of assessed problem(s)

## 2023-05-24 NOTE — PHYSICAL EXAM
[Well Developed] : well developed [Well Nourished] : well nourished [Normal Conjunctiva] : normal conjunctiva [Normal S1, S2] : normal S1, S2 [No Murmur] : no murmur [Clear Lung Fields] : clear lung fields [Soft] : abdomen soft [Non Tender] : non-tender [Normal Gait] : normal gait [No Edema] : no edema [de-identified] :  sharp fully intact no acute distress [de-identified] :  no rales rhonchi or wheezes

## 2023-05-24 NOTE — HISTORY OF PRESENT ILLNESS
[FreeTextEntry1] :  in brief Shadi Tolentino  is amazingly intact 89-year-old with a history of some mild hypertension no diabetes no history of prior cardiac issues. she has a history of hyperlipidemia on simvastatin 10\par \par   She has some chronic exertional shortness of breath and a chronic cough and has been diagnosed with GERD as well.\par \par   She has been complaining of significant abdominal pain with loose stools.  She had a GI evaluation.  GI did not feel she was a good candidate for colonoscopy.  The GI symptoms seem to be abating\par \par  CT of the chest dated 5/9/2023: In comparison with 4/11/2023 stable 1.2 cm nodule opacity within the anterior upper lobe most likely combination of mucoid impaction and atelectasis.  There was an element of coronary calcification\par \par  CT of the abdomen April 11, 2023: chest lower bilateral dependent atelectasis bilateral trace effusions diffuse coronary artery atherosclerosis mitral annular calcification liver bile ducts gallbladder pancreas adrenals kidneys were all unremarkable.\par \par  patient's present medication including hyoscyamine sulfate and simvastatin 10\par \par  EKG dated May 24, 2023: Normal sinus rhythm occasional unifocal PVC otherwise within normal limit and unchanged from EKG from 6 months ago

## 2023-05-24 NOTE — REASON FOR VISIT
[FreeTextEntry1] : Shadi Tolentino 89 years old here for evaluation of her cardiac status.  Apparently I have seen her in the remote past and did a noninvasive cardiac work-up which she reports is being unremarkable.  She recently has been having significant GI symptoms and a cardiac evaluation was requested by gastroenterology

## 2023-05-24 NOTE — ASSESSMENT
[FreeTextEntry1] : Shadi Tolentino  is a remarkable 89-year-old with some mild hypertension mild hyperlipidemia otherwise healthy with chronic shortness of breath and chronic cough and now with significant abdominal discomfort diarrhea which seems to be resolving.  I believe her cardiac status is relatively stable.  It is certainly possible that the shortness of breath is related to some degree of diastolic dysfunction.  She does have calcification of her coronaries but she is 89 years old.  There is no evidence of any acute cardiac issues\par \par  for now I would recommend careful follow-up of her blood pressure and a 2D echo to evaluate LV and RV function rule out any valvular disease though clinically I do not find any evidence\par \par .  I reassured her and her son that I did not think that there was any acute cardiac issues but we would pursue a modest noninvasive work-up to include an echocardiogram

## 2023-05-25 ENCOUNTER — APPOINTMENT (OUTPATIENT)
Dept: CARDIOLOGY | Facility: CLINIC | Age: 88
End: 2023-05-25
Payer: MEDICARE

## 2023-05-25 PROCEDURE — 93306 TTE W/DOPPLER COMPLETE: CPT

## 2023-05-30 ENCOUNTER — APPOINTMENT (OUTPATIENT)
Dept: FAMILY MEDICINE | Facility: CLINIC | Age: 88
End: 2023-05-30
Payer: MEDICARE

## 2023-05-30 VITALS
HEART RATE: 81 BPM | SYSTOLIC BLOOD PRESSURE: 150 MMHG | RESPIRATION RATE: 16 BRPM | TEMPERATURE: 97.3 F | DIASTOLIC BLOOD PRESSURE: 80 MMHG | OXYGEN SATURATION: 96 %

## 2023-05-30 DIAGNOSIS — R10.30 LOWER ABDOMINAL PAIN, UNSPECIFIED: ICD-10-CM

## 2023-05-30 DIAGNOSIS — R00.2 PALPITATIONS: ICD-10-CM

## 2023-05-30 PROCEDURE — 99215 OFFICE O/P EST HI 40 MIN: CPT

## 2023-05-30 NOTE — HISTORY OF PRESENT ILLNESS
[de-identified] : 89 y/o F presents accompanied bynher HHA for f/up and BP check. Pt continues to have abdominal pain and loose stools however better. [facility] . Also evaluated by Cardio.as patient was having some palpitations.  Feeling anxious. BP elevated. Home monitoring 177/90, 162/85,132/66.  Had Echo

## 2023-05-30 NOTE — REVIEW OF SYSTEMS
[Fatigue] : fatigue [Palpitations] : palpitations [Shortness Of Breath] : no shortness of breath [Diarrhea] : diarrhea [Unsteady Walking] : ataxia [Anxiety] : anxiety [Depression] : depression [Negative] : Genitourinary

## 2023-05-30 NOTE — PHYSICAL EXAM
[Normal] : normal rate, regular rhythm, normal S1 and S2 and no murmur heard [de-identified] : walker for ambulation [de-identified] : anxius affect

## 2023-06-01 ENCOUNTER — RX RENEWAL (OUTPATIENT)
Age: 88
End: 2023-06-01

## 2023-06-01 RX ORDER — HYOSCYAMINE SULFATE 0.12 MG/1
0.12 TABLET SUBLINGUAL 3 TIMES DAILY
Qty: 30 | Refills: 1 | Status: ACTIVE | COMMUNITY
Start: 2023-05-02 | End: 1900-01-01

## 2023-06-11 ENCOUNTER — TRANSCRIPTION ENCOUNTER (OUTPATIENT)
Age: 88
End: 2023-06-11

## 2023-07-30 ENCOUNTER — INPATIENT (INPATIENT)
Facility: HOSPITAL | Age: 88
LOS: 2 days | Discharge: HOME | DRG: 871 | End: 2023-08-02
Attending: STUDENT IN AN ORGANIZED HEALTH CARE EDUCATION/TRAINING PROGRAM | Admitting: STUDENT IN AN ORGANIZED HEALTH CARE EDUCATION/TRAINING PROGRAM
Payer: MEDICARE

## 2023-07-30 VITALS
SYSTOLIC BLOOD PRESSURE: 187 MMHG | RESPIRATION RATE: 18 BRPM | TEMPERATURE: 98 F | HEIGHT: 64 IN | WEIGHT: 117.95 LBS | DIASTOLIC BLOOD PRESSURE: 85 MMHG | HEART RATE: 90 BPM | OXYGEN SATURATION: 97 %

## 2023-07-30 DIAGNOSIS — J18.9 PNEUMONIA, UNSPECIFIED ORGANISM: ICD-10-CM

## 2023-07-30 LAB
ALBUMIN SERPL ELPH-MCNC: 4.4 G/DL — SIGNIFICANT CHANGE UP (ref 3.3–5)
ALP SERPL-CCNC: 87 U/L — SIGNIFICANT CHANGE UP (ref 40–120)
ALT FLD-CCNC: 16 U/L — SIGNIFICANT CHANGE UP (ref 10–45)
ANION GAP SERPL CALC-SCNC: 15 MMOL/L — SIGNIFICANT CHANGE UP (ref 5–17)
AST SERPL-CCNC: 27 U/L — SIGNIFICANT CHANGE UP (ref 10–40)
BASE EXCESS BLDV CALC-SCNC: 2.9 MMOL/L — SIGNIFICANT CHANGE UP (ref -2–3)
BASOPHILS # BLD AUTO: 0.04 K/UL — SIGNIFICANT CHANGE UP (ref 0–0.2)
BASOPHILS NFR BLD AUTO: 0.3 % — SIGNIFICANT CHANGE UP (ref 0–2)
BILIRUB SERPL-MCNC: 0.5 MG/DL — SIGNIFICANT CHANGE UP (ref 0.2–1.2)
BUN SERPL-MCNC: 24 MG/DL — HIGH (ref 7–23)
CA-I SERPL-SCNC: 1.02 MMOL/L — LOW (ref 1.15–1.33)
CALCIUM SERPL-MCNC: 9.9 MG/DL — SIGNIFICANT CHANGE UP (ref 8.4–10.5)
CHLORIDE BLDV-SCNC: 97 MMOL/L — SIGNIFICANT CHANGE UP (ref 96–108)
CHLORIDE SERPL-SCNC: 97 MMOL/L — SIGNIFICANT CHANGE UP (ref 96–108)
CO2 BLDV-SCNC: 32 MMOL/L — HIGH (ref 22–26)
CO2 SERPL-SCNC: 24 MMOL/L — SIGNIFICANT CHANGE UP (ref 22–31)
CREAT SERPL-MCNC: 1.18 MG/DL — SIGNIFICANT CHANGE UP (ref 0.5–1.3)
EGFR: 42 ML/MIN/1.73M2 — LOW
EOSINOPHIL # BLD AUTO: 0.2 K/UL — SIGNIFICANT CHANGE UP (ref 0–0.5)
EOSINOPHIL NFR BLD AUTO: 1.5 % — SIGNIFICANT CHANGE UP (ref 0–6)
GAS PNL BLDV: 124 MMOL/L — LOW (ref 136–145)
GAS PNL BLDV: SIGNIFICANT CHANGE UP
GAS PNL BLDV: SIGNIFICANT CHANGE UP
GLUCOSE BLDV-MCNC: 118 MG/DL — HIGH (ref 70–99)
GLUCOSE SERPL-MCNC: 120 MG/DL — HIGH (ref 70–99)
HCO3 BLDV-SCNC: 30 MMOL/L — HIGH (ref 22–29)
HCT VFR BLD CALC: 41.9 % — SIGNIFICANT CHANGE UP (ref 34.5–45)
HCT VFR BLDA CALC: 42 % — SIGNIFICANT CHANGE UP (ref 34.5–46.5)
HGB BLD CALC-MCNC: 13.9 G/DL — SIGNIFICANT CHANGE UP (ref 11.7–16.1)
HGB BLD-MCNC: 13.4 G/DL — SIGNIFICANT CHANGE UP (ref 11.5–15.5)
IMM GRANULOCYTES NFR BLD AUTO: 0.4 % — SIGNIFICANT CHANGE UP (ref 0–0.9)
LACTATE BLDV-MCNC: 1.3 MMOL/L — SIGNIFICANT CHANGE UP (ref 0.5–2)
LIDOCAIN IGE QN: 60 U/L — SIGNIFICANT CHANGE UP (ref 7–60)
LYMPHOCYTES # BLD AUTO: 1.4 K/UL — SIGNIFICANT CHANGE UP (ref 1–3.3)
LYMPHOCYTES # BLD AUTO: 10.3 % — LOW (ref 13–44)
MCHC RBC-ENTMCNC: 30.4 PG — SIGNIFICANT CHANGE UP (ref 27–34)
MCHC RBC-ENTMCNC: 32 GM/DL — SIGNIFICANT CHANGE UP (ref 32–36)
MCV RBC AUTO: 95 FL — SIGNIFICANT CHANGE UP (ref 80–100)
MONOCYTES # BLD AUTO: 1 K/UL — HIGH (ref 0–0.9)
MONOCYTES NFR BLD AUTO: 7.4 % — SIGNIFICANT CHANGE UP (ref 2–14)
NEUTROPHILS # BLD AUTO: 10.89 K/UL — HIGH (ref 1.8–7.4)
NEUTROPHILS NFR BLD AUTO: 80.1 % — HIGH (ref 43–77)
NRBC # BLD: 0 /100 WBCS — SIGNIFICANT CHANGE UP (ref 0–0)
PCO2 BLDV: 54 MMHG — HIGH (ref 39–42)
PH BLDV: 7.35 — SIGNIFICANT CHANGE UP (ref 7.32–7.43)
PLATELET # BLD AUTO: 210 K/UL — SIGNIFICANT CHANGE UP (ref 150–400)
PO2 BLDV: 27 MMHG — SIGNIFICANT CHANGE UP (ref 25–45)
POTASSIUM BLDV-SCNC: >10 MMOL/L — CRITICAL HIGH (ref 3.5–5.1)
POTASSIUM SERPL-MCNC: 4.4 MMOL/L — SIGNIFICANT CHANGE UP (ref 3.5–5.3)
POTASSIUM SERPL-SCNC: 4.4 MMOL/L — SIGNIFICANT CHANGE UP (ref 3.5–5.3)
PROT SERPL-MCNC: 8.2 G/DL — SIGNIFICANT CHANGE UP (ref 6–8.3)
RBC # BLD: 4.41 M/UL — SIGNIFICANT CHANGE UP (ref 3.8–5.2)
RBC # FLD: 13.7 % — SIGNIFICANT CHANGE UP (ref 10.3–14.5)
SAO2 % BLDV: 41.3 % — LOW (ref 67–88)
SODIUM SERPL-SCNC: 136 MMOL/L — SIGNIFICANT CHANGE UP (ref 135–145)
WBC # BLD: 13.58 K/UL — HIGH (ref 3.8–10.5)
WBC # FLD AUTO: 13.58 K/UL — HIGH (ref 3.8–10.5)

## 2023-07-30 PROCEDURE — 99285 EMERGENCY DEPT VISIT HI MDM: CPT

## 2023-07-30 PROCEDURE — 71045 X-RAY EXAM CHEST 1 VIEW: CPT | Mod: 26

## 2023-07-30 RX ORDER — CEFTRIAXONE 500 MG/1
1000 INJECTION, POWDER, FOR SOLUTION INTRAMUSCULAR; INTRAVENOUS ONCE
Refills: 0 | Status: COMPLETED | OUTPATIENT
Start: 2023-07-30 | End: 2023-07-30

## 2023-07-30 RX ORDER — IBUPROFEN 200 MG
400 TABLET ORAL ONCE
Refills: 0 | Status: COMPLETED | OUTPATIENT
Start: 2023-07-30 | End: 2023-07-30

## 2023-07-30 RX ORDER — SODIUM CHLORIDE 9 MG/ML
500 INJECTION INTRAMUSCULAR; INTRAVENOUS; SUBCUTANEOUS ONCE
Refills: 0 | Status: COMPLETED | OUTPATIENT
Start: 2023-07-30 | End: 2023-07-30

## 2023-07-30 RX ORDER — ACETAMINOPHEN 500 MG
1000 TABLET ORAL ONCE
Refills: 0 | Status: COMPLETED | OUTPATIENT
Start: 2023-07-30 | End: 2023-07-30

## 2023-07-30 RX ORDER — AZITHROMYCIN 500 MG/1
500 TABLET, FILM COATED ORAL ONCE
Refills: 0 | Status: COMPLETED | OUTPATIENT
Start: 2023-07-30 | End: 2023-07-30

## 2023-07-30 RX ADMIN — SODIUM CHLORIDE 500 MILLILITER(S): 9 INJECTION INTRAMUSCULAR; INTRAVENOUS; SUBCUTANEOUS at 21:49

## 2023-07-30 RX ADMIN — AZITHROMYCIN 255 MILLIGRAM(S): 500 TABLET, FILM COATED ORAL at 22:37

## 2023-07-30 RX ADMIN — Medication 400 MILLIGRAM(S): at 21:22

## 2023-07-30 RX ADMIN — CEFTRIAXONE 100 MILLIGRAM(S): 500 INJECTION, POWDER, FOR SOLUTION INTRAMUSCULAR; INTRAVENOUS at 21:50

## 2023-07-30 RX ADMIN — Medication 400 MILLIGRAM(S): at 21:50

## 2023-07-30 NOTE — ED PROVIDER NOTE - PHYSICAL EXAMINATION
General: Appears well and nontoxic  Mentation: AAO x 3  psych: mood appropriate  HEENT: airway patent, conjunctivae clear bilaterally, MATHEUS  Resp: symmetric chest rise, no resp distress, breath sounds CTA bilaterally  Cardio: RRR, no m/r/g  GI: soft/nondistended/nontender  Neuro: sensation and motor function grossly intact  Skin: no cyanosis, no jaundice  MSK: left chest wall tenderness under left breast  Lymph/Vasc: no LE edema

## 2023-07-30 NOTE — ED PROVIDER NOTE - RAPID ASSESSMENT
89 y F h/o hyperlipidemia and chronic cough c acute onset L chest/L axillary chest wall pain approx 2 hrs ago, no trauma, pleuritic, sharp, limits full inhalation. Believes there is some dyspnea on exertion associated.  Worse with movement.  No stated rash (unable to eval in [facility]).  No lightheadedness.  No LE swelling/pain or recent travel.  No h/o VTE.    No overt tachypnea; Lungs c possible mild crackles at L>R base    *** I, Miguel A Alanis MD., in the role of "Quick Triage Doctor (QDOC)," performed an initial, limited, rapid assessment of this patient, through either telemedicine or face to face encounter.  Based on history of present illness and general appearance, I determined that the patient should be evaluated in the main ED and initiated a preliminary workup to expedite the patient's course in the emergency department; this patient's evaluation is not complete and only preliminary.     The full assessment, management, and reassessment of this patient, including but not limited to the follow up of ordered laboratory and radiologic testing, and any/all other patient care impacting decisions is deferred to the receiving primary emergency department team.  I was not involved in this patient's care after the QDOC process, and unless otherwise noted in the ED provider note, was not involved in their care during their ED course     I saw the patient waiting area; a brief history was taken and a thorough physical exam was not performed as there is no physical exam room available.  The patient will be seen and further worked up in the main emergency department and their care will be completed by the main emergency department team.  I was not involved in this patient's care after the QDOC process, and unless otherwise noted in the ED provider note, was not involved in their care during their ED course.      The scribe's documentation, where applicable, has been prepared under my direction and personally reviewed by me in its entirety. I confirm that the note above accurately reflects all work, treatment, procedures, and medical decision making performed by me (Dr. Alanis). ***

## 2023-07-30 NOTE — ED PROVIDER NOTE - ATTENDING CONTRIBUTION TO CARE
Herb Orourke DO: I have personally performed a face to face medical and diagnostic evaluation of the patient. I have discussed with and reviewed the Resident's and/or ACP's and/or Medical/PA/NP student's note and agree with the History, ROS, Physical Exam and MDM unless otherwise indicated. A brief summary of my personal evaluation and impression can be found below.    89-year-old female with a past medical history of hyperlipidemia presenting with chest pain. Patient was relaxing in her recliner and had acute onset sharp chest pain under the left breast. Pain does not radiate, not worse with exertion, denies nausea, vomiting, diaphoresis. Patient denies any recent travel. Patient states the pain is worse when taking deep breaths or coughing.    Physical exam patient is in no respiratory distress no lower extremity edema heart rate and rhythm unremarkable lungs clear to auscultation bilaterally patient did have some point tenderness on the chest wall just under her left breast.    Given patient's age will get labs to rule out ACS EKG patient states that her chest pain is worse with taking a deep breath, no fevers or chills, concern for infectious etiology, acute noted some crackles over left greater than right base, PE a consideration however more likely other cause for pleuritic chest pain we will get chest x-ray to evaluate, labs including CBC CMP troponin, patient is currently afebrile not tachycardic, dispo pending results of imaging and labs.

## 2023-07-30 NOTE — ED PROVIDER NOTE - OBJECTIVE STATEMENT
89-year-old female with a past medical history of hyperlipidemia presenting with chest pain. Patient was relaxing in her recliner and had acute onset sharp chest pain under the left breast. Pain does not radiate, not worse with exertion, denies nausea, vomiting, diaphoresis. Patient denies any recent travel. Patient states the pain is worse when taking deep breaths or coughing.

## 2023-07-30 NOTE — ED ADULT NURSE NOTE - NSFALLHARMRISKINTERV_ED_ALL_ED

## 2023-07-30 NOTE — ED ADULT NURSE NOTE - OBJECTIVE STATEMENT
89 y/o female presenting to ED for chest pain on inspiration. pt reports that she was relaxing in her recliner and suddenly had sharp chest pain under her left breast. pt reports pain is nonradiating and not worsening with exertion. Upon exam pt A&Ox3 gross neuro intact,  no difficulty speaking in complete sentences, s1s2 heart sounds heard, pulses x 4, johnson x4, abdomen soft nontender nondistended, blanchable redness noted to sacrum. pt denies  sob, ha, n/v/d, abdominal pain, f/c, urinary symptoms, hematuria. placed on CM NSR 90s.

## 2023-07-30 NOTE — ED PROVIDER NOTE - CLINICAL SUMMARY MEDICAL DECISION MAKING FREE TEXT BOX
89-year-old female with a past medical history of hyperlipidemia presenting with acute onset chest pain, worse with taking deep breaths, coughing. Denies fevers. Patient was seen by Q azucena and had chest x-ray. Concern for left lower lobe pneumonia. Will treat with antibiotics and admit patient for pneumonia.

## 2023-07-31 DIAGNOSIS — Z90.49 ACQUIRED ABSENCE OF OTHER SPECIFIED PARTS OF DIGESTIVE TRACT: Chronic | ICD-10-CM

## 2023-07-31 DIAGNOSIS — Z86.718 PERSONAL HISTORY OF OTHER VENOUS THROMBOSIS AND EMBOLISM: ICD-10-CM

## 2023-07-31 DIAGNOSIS — E78.5 HYPERLIPIDEMIA, UNSPECIFIED: ICD-10-CM

## 2023-07-31 DIAGNOSIS — Z29.9 ENCOUNTER FOR PROPHYLACTIC MEASURES, UNSPECIFIED: ICD-10-CM

## 2023-07-31 DIAGNOSIS — J18.9 PNEUMONIA, UNSPECIFIED ORGANISM: ICD-10-CM

## 2023-07-31 LAB
ANION GAP SERPL CALC-SCNC: 14 MMOL/L — SIGNIFICANT CHANGE UP (ref 5–17)
APPEARANCE UR: CLEAR — SIGNIFICANT CHANGE UP
BILIRUB UR-MCNC: NEGATIVE — SIGNIFICANT CHANGE UP
BUN SERPL-MCNC: 25 MG/DL — HIGH (ref 7–23)
CALCIUM SERPL-MCNC: 9 MG/DL — SIGNIFICANT CHANGE UP (ref 8.4–10.5)
CHLORIDE SERPL-SCNC: 98 MMOL/L — SIGNIFICANT CHANGE UP (ref 96–108)
CO2 SERPL-SCNC: 22 MMOL/L — SIGNIFICANT CHANGE UP (ref 22–31)
COLOR SPEC: SIGNIFICANT CHANGE UP
CREAT SERPL-MCNC: 1.08 MG/DL — SIGNIFICANT CHANGE UP (ref 0.5–1.3)
DIFF PNL FLD: NEGATIVE — SIGNIFICANT CHANGE UP
EGFR: 47 ML/MIN/1.73M2 — LOW
GLUCOSE SERPL-MCNC: 89 MG/DL — SIGNIFICANT CHANGE UP (ref 70–99)
GLUCOSE UR QL: NEGATIVE — SIGNIFICANT CHANGE UP
HCT VFR BLD CALC: 36.6 % — SIGNIFICANT CHANGE UP (ref 34.5–45)
HGB BLD-MCNC: 12 G/DL — SIGNIFICANT CHANGE UP (ref 11.5–15.5)
KETONES UR-MCNC: NEGATIVE — SIGNIFICANT CHANGE UP
LEUKOCYTE ESTERASE UR-ACNC: NEGATIVE — SIGNIFICANT CHANGE UP
MAGNESIUM SERPL-MCNC: 2.1 MG/DL — SIGNIFICANT CHANGE UP (ref 1.6–2.6)
MCHC RBC-ENTMCNC: 31 PG — SIGNIFICANT CHANGE UP (ref 27–34)
MCHC RBC-ENTMCNC: 32.8 GM/DL — SIGNIFICANT CHANGE UP (ref 32–36)
MCV RBC AUTO: 94.6 FL — SIGNIFICANT CHANGE UP (ref 80–100)
MRSA PCR RESULT.: SIGNIFICANT CHANGE UP
NITRITE UR-MCNC: NEGATIVE — SIGNIFICANT CHANGE UP
NRBC # BLD: 0 /100 WBCS — SIGNIFICANT CHANGE UP (ref 0–0)
PH UR: 6.5 — SIGNIFICANT CHANGE UP (ref 5–8)
PHOSPHATE SERPL-MCNC: 3.4 MG/DL — SIGNIFICANT CHANGE UP (ref 2.5–4.5)
PLATELET # BLD AUTO: 156 K/UL — SIGNIFICANT CHANGE UP (ref 150–400)
POTASSIUM SERPL-MCNC: 4.9 MMOL/L — SIGNIFICANT CHANGE UP (ref 3.5–5.3)
POTASSIUM SERPL-SCNC: 4.9 MMOL/L — SIGNIFICANT CHANGE UP (ref 3.5–5.3)
PROCALCITONIN SERPL-MCNC: 0.08 NG/ML — SIGNIFICANT CHANGE UP (ref 0.02–0.1)
PROT UR-MCNC: NEGATIVE — SIGNIFICANT CHANGE UP
RBC # BLD: 3.87 M/UL — SIGNIFICANT CHANGE UP (ref 3.8–5.2)
RBC # FLD: 13.7 % — SIGNIFICANT CHANGE UP (ref 10.3–14.5)
S AUREUS DNA NOSE QL NAA+PROBE: SIGNIFICANT CHANGE UP
SODIUM SERPL-SCNC: 134 MMOL/L — LOW (ref 135–145)
SP GR SPEC: 1.01 — SIGNIFICANT CHANGE UP (ref 1.01–1.02)
TROPONIN T, HIGH SENSITIVITY RESULT: 42 NG/L — SIGNIFICANT CHANGE UP (ref 0–51)
UROBILINOGEN FLD QL: NEGATIVE — SIGNIFICANT CHANGE UP
WBC # BLD: 7.05 K/UL — SIGNIFICANT CHANGE UP (ref 3.8–10.5)
WBC # FLD AUTO: 7.05 K/UL — SIGNIFICANT CHANGE UP (ref 3.8–10.5)

## 2023-07-31 PROCEDURE — 93970 EXTREMITY STUDY: CPT | Mod: 26

## 2023-07-31 PROCEDURE — 99223 1ST HOSP IP/OBS HIGH 75: CPT

## 2023-07-31 RX ORDER — CEFTRIAXONE 500 MG/1
1000 INJECTION, POWDER, FOR SOLUTION INTRAMUSCULAR; INTRAVENOUS EVERY 24 HOURS
Refills: 0 | Status: DISCONTINUED | OUTPATIENT
Start: 2023-07-31 | End: 2023-08-01

## 2023-07-31 RX ORDER — ENOXAPARIN SODIUM 100 MG/ML
30 INJECTION SUBCUTANEOUS EVERY 24 HOURS
Refills: 0 | Status: DISCONTINUED | OUTPATIENT
Start: 2023-07-31 | End: 2023-08-02

## 2023-07-31 RX ORDER — AZITHROMYCIN 500 MG/1
500 TABLET, FILM COATED ORAL EVERY 24 HOURS
Refills: 0 | Status: DISCONTINUED | OUTPATIENT
Start: 2023-07-31 | End: 2023-08-01

## 2023-07-31 RX ORDER — GUAIFENESIN/DEXTROMETHORPHAN 600MG-30MG
10 TABLET, EXTENDED RELEASE 12 HR ORAL EVERY 4 HOURS
Refills: 0 | Status: DISCONTINUED | OUTPATIENT
Start: 2023-07-31 | End: 2023-08-02

## 2023-07-31 RX ORDER — ACETAMINOPHEN 500 MG
650 TABLET ORAL EVERY 6 HOURS
Refills: 0 | Status: DISCONTINUED | OUTPATIENT
Start: 2023-07-31 | End: 2023-08-02

## 2023-07-31 RX ORDER — SIMVASTATIN 20 MG/1
10 TABLET, FILM COATED ORAL AT BEDTIME
Refills: 0 | Status: DISCONTINUED | OUTPATIENT
Start: 2023-07-31 | End: 2023-08-02

## 2023-07-31 RX ORDER — LACTOBACILLUS ACIDOPHILUS 100MM CELL
1 CAPSULE ORAL DAILY
Refills: 0 | Status: DISCONTINUED | OUTPATIENT
Start: 2023-07-31 | End: 2023-08-02

## 2023-07-31 RX ADMIN — SIMVASTATIN 10 MILLIGRAM(S): 20 TABLET, FILM COATED ORAL at 18:36

## 2023-07-31 RX ADMIN — CEFTRIAXONE 100 MILLIGRAM(S): 500 INJECTION, POWDER, FOR SOLUTION INTRAMUSCULAR; INTRAVENOUS at 22:03

## 2023-07-31 RX ADMIN — AZITHROMYCIN 255 MILLIGRAM(S): 500 TABLET, FILM COATED ORAL at 22:03

## 2023-07-31 RX ADMIN — Medication 10 MILLILITER(S): at 11:29

## 2023-07-31 RX ADMIN — Medication 1 TABLET(S): at 11:29

## 2023-07-31 NOTE — H&P ADULT - NSHPPHYSICALEXAM_GEN_ALL_CORE
Vital Signs Last 24 Hrs  T(C): 38.4 (30 Jul 2023 21:33), Max: 38.4 (30 Jul 2023 21:33)  T(F): 101.1 (30 Jul 2023 21:33), Max: 101.1 (30 Jul 2023 21:33)  HR: 80 (30 Jul 2023 21:33) (80 - 90)  BP: 131/93 (30 Jul 2023 22:20) (131/93 - 196/92)  RR: 17 (30 Jul 2023 21:33) (17 - 18)  SpO2: 98% (30 Jul 2023 21:33) (96% - 98%)    Parameters below as of 30 Jul 2023 21:33  Patient On (Oxygen Delivery Method): room air    CONSTITUTIONAL: Well-groomed, in no apparent distress  EYES: No conjunctival or scleral injection, non-icteric;   ENMT: No external nasal lesions; MMM  NECK: Trachea midline without palpable neck mass; thyroid not enlarged and non-tender  RESPIRATORY: Crackles LLL. Breathing comfortably; no dullness to percussion;   CARDIOVASCULAR: +S1S2, RRR, no M/G/R; pedal pulses full and symmetric; no lower extremity edema  GASTROINTESTINAL: No palpable masses or tenderness, +BS throughout, no rebound/guarding; no hepatosplenomegaly; no hernia palpated  LYMPHATIC: No cervical LAD or tenderness  SKIN: No rashes or ulcers noted  NEUROLOGIC: CN II-XII intact; sensation intact in LEs b/l to light touch  PSYCHIATRIC: A&Ox3; mood and affect appropriate; appropriate insight and judgment

## 2023-07-31 NOTE — PROGRESS NOTE ADULT - ASSESSMENT
89yo F w/ PMH of HLD on Simvastatin and DVT in 2015 no longer on AC pw CP and cough found to be septic i/s/o LLL CAP

## 2023-07-31 NOTE — H&P ADULT - NSHPREVIEWOFSYSTEMS_GEN_ALL_CORE
CONSTITUTIONAL: No fever, weight loss  EYES: No eye pain, visual disturbances, or discharge  ENMT:  No difficulty hearing, tinnitus, vertigo; No sinus or throat pain  RESPIRATORY: SOB, cough. No wheezing, chills or hemoptysis  CARDIOVASCULAR: Chest pain. No palpitations, dizziness, or leg swelling  GASTROINTESTINAL: No abdominal or epigastric pain. No nausea, vomiting, or hematemesis; No diarrhea or constipation. No melena or hematochezia.  GENITOURINARY: No dysuria, frequency, hematuria, or incontinence  NEUROLOGICAL: No headaches, memory loss, loss of strength, numbness, or tremors  SKIN: No itching, burning, rashes, or lesions   LYMPH NODES: No enlarged glands  ENDOCRINE: No heat or cold intolerance; No hair loss  MUSCULOSKELETAL: No joint pain or swelling; No muscle, back pain  PSYCHIATRIC: No depression, anxiety, mood swings, or difficulty sleeping  HEME/LYMPH: No easy bruising, or bleeding gums

## 2023-07-31 NOTE — H&P ADULT - NSHPLABSRESULTS_GEN_ALL_CORE
LABS:                      13.4   13.58 )-----------( 210      ( 30 Jul 2023 20:35 )             41.9     07-30    136  |  97  |  24<H>  ----------------------------<  120<H>  4.4   |  24  |  1.18    Ca    9.9      30 Jul 2023 20:35    TPro  8.2  /  Alb  4.4  /  TBili  0.5  /  DBili  x   /  AST  27  /  ALT  16  /  AlkPhos  87  07-30    LIVER FUNCTIONS - ( 30 Jul 2023 20:35 )  Alb: 4.4 g/dL / Pro: 8.2 g/dL / ALK PHOS: 87 U/L / ALT: 16 U/L / AST: 27 U/L / GGT: x           Urinalysis Basic - ( 30 Jul 2023 20:35 )  Color: x / Appearance: x / SG: x / pH: x  Gluc: 120 mg/dL / Ketone: x  / Bili: x / Urobili: x   Blood: x / Protein: x / Nitrite: x   Leuk Esterase: x / RBC: x / WBC x   Sq Epi: x / Non Sq Epi: x / Bacteria: x    IMAGING:  Xray Chest 1 View AP/PA (07.30.23 @ 20:47)  IMPRESSION:  - Hazy opacity in left lower lung field. Etiology is unclear.  ******PRELIMINARY REPORT******      [X] Imaging personally reviewed by me- LLL opacity  [X] ECG personally interpreted by me- sinus w/out acute ischemic changes

## 2023-07-31 NOTE — H&P ADULT - PROBLEM SELECTOR PLAN 1
- Febrile to 101 w/ leukocytosis to 13 and imaging w/ LLL opacity  - w/ productive cough c/f CAP  - s/p CTX and Azithro in the ED, will c/w CTX 1g qd and Azithro 500mg qd  - f/u MRSA PCR (no risk factors, will hold on Vanco for now)  - f/u sputum cx if able to obtain  - f/u UA and blood cx (Ordered after administration of abx in the ED)  - Of note pt reports difficulty w/ swallowing large pills (including prior abx)- If transition to PO would find liquid alternative or discuss pill size w/ pharm prior to prescription

## 2023-07-31 NOTE — H&P ADULT - HISTORY OF PRESENT ILLNESS
Pt is a 89yo F w/ PMH of HLD on Simvastatin and DVT in 2015 no longer on AC pw CP and cough.    Reports ongoing productive cough for over a year which she has been worked up for in the past as well as follows w/ pulmonology outpt for. Today she reports sudden onset 10/10 constant left lower stabbing CP that was exacerbated by cough and worse w/ movement.     Here in the ED reports symptoms are much better to almost resolved. She otherwise denies any recent travel, no sick contacts, no other CP, SOB, palpitations and no F/C at home.     In the ED pt found to be febrile to 101 w/ leukocytosis to 13 and LLL hazy opacity on CXR c/f CAP s/p Azithro and CTX.     Of note she does report hx of UTI about 3 months ago for which she completed a course of abx- But during that course she reports profuse diarrhea i/s/o abx she was on and required presentation to the ED for dehydration. She also reports an inability to tolerate pills as she has difficulty swallowing them.

## 2023-07-31 NOTE — H&P ADULT - PROBLEM SELECTOR PLAN 3
- Hx of DVT in 2015 noted on medical chart s/p course of Xarelto  - Currently w/out calf tenderness and no edema, warmth or erythema  - Given current symptoms of acute onset pleuritic CP, wells score for PE is 1.5, this less likely  - Will hold off on CTA given normotensive and not hypoxic w/ low probability  - Ordered b/l LE duplex to r/o DVT, f/u
General

## 2023-07-31 NOTE — H&P ADULT - ASSESSMENT
89yo F w/ PMH of HLD on Simvastatin and DVT in 2015 no longer on AC pw CP and cough found to be septic i/s/o LLL CAP

## 2023-08-01 ENCOUNTER — APPOINTMENT (OUTPATIENT)
Dept: FAMILY MEDICINE | Facility: CLINIC | Age: 88
End: 2023-08-01

## 2023-08-01 ENCOUNTER — TRANSCRIPTION ENCOUNTER (OUTPATIENT)
Age: 88
End: 2023-08-01

## 2023-08-01 LAB
ANION GAP SERPL CALC-SCNC: 15 MMOL/L — SIGNIFICANT CHANGE UP (ref 5–17)
BASOPHILS # BLD AUTO: 0.03 K/UL — SIGNIFICANT CHANGE UP (ref 0–0.2)
BASOPHILS NFR BLD AUTO: 0.4 % — SIGNIFICANT CHANGE UP (ref 0–2)
BUN SERPL-MCNC: 19 MG/DL — SIGNIFICANT CHANGE UP (ref 7–23)
CALCIUM SERPL-MCNC: 9.7 MG/DL — SIGNIFICANT CHANGE UP (ref 8.4–10.5)
CHLORIDE SERPL-SCNC: 99 MMOL/L — SIGNIFICANT CHANGE UP (ref 96–108)
CO2 SERPL-SCNC: 24 MMOL/L — SIGNIFICANT CHANGE UP (ref 22–31)
CREAT SERPL-MCNC: 0.96 MG/DL — SIGNIFICANT CHANGE UP (ref 0.5–1.3)
CULTURE RESULTS: SIGNIFICANT CHANGE UP
EGFR: 54 ML/MIN/1.73M2 — LOW
EOSINOPHIL # BLD AUTO: 0.41 K/UL — SIGNIFICANT CHANGE UP (ref 0–0.5)
EOSINOPHIL NFR BLD AUTO: 5.5 % — SIGNIFICANT CHANGE UP (ref 0–6)
GLUCOSE SERPL-MCNC: 110 MG/DL — HIGH (ref 70–99)
HCT VFR BLD CALC: 39.9 % — SIGNIFICANT CHANGE UP (ref 34.5–45)
HGB BLD-MCNC: 12.9 G/DL — SIGNIFICANT CHANGE UP (ref 11.5–15.5)
IMM GRANULOCYTES NFR BLD AUTO: 0.4 % — SIGNIFICANT CHANGE UP (ref 0–0.9)
LEGIONELLA AG UR QL: NEGATIVE — SIGNIFICANT CHANGE UP
LYMPHOCYTES # BLD AUTO: 1.38 K/UL — SIGNIFICANT CHANGE UP (ref 1–3.3)
LYMPHOCYTES # BLD AUTO: 18.6 % — SIGNIFICANT CHANGE UP (ref 13–44)
MAGNESIUM SERPL-MCNC: 2.1 MG/DL — SIGNIFICANT CHANGE UP (ref 1.6–2.6)
MCHC RBC-ENTMCNC: 30.5 PG — SIGNIFICANT CHANGE UP (ref 27–34)
MCHC RBC-ENTMCNC: 32.3 GM/DL — SIGNIFICANT CHANGE UP (ref 32–36)
MCV RBC AUTO: 94.3 FL — SIGNIFICANT CHANGE UP (ref 80–100)
MONOCYTES # BLD AUTO: 0.84 K/UL — SIGNIFICANT CHANGE UP (ref 0–0.9)
MONOCYTES NFR BLD AUTO: 11.4 % — SIGNIFICANT CHANGE UP (ref 2–14)
NEUTROPHILS # BLD AUTO: 4.71 K/UL — SIGNIFICANT CHANGE UP (ref 1.8–7.4)
NEUTROPHILS NFR BLD AUTO: 63.7 % — SIGNIFICANT CHANGE UP (ref 43–77)
NRBC # BLD: 0 /100 WBCS — SIGNIFICANT CHANGE UP (ref 0–0)
PHOSPHATE SERPL-MCNC: 3.1 MG/DL — SIGNIFICANT CHANGE UP (ref 2.5–4.5)
PLATELET # BLD AUTO: 181 K/UL — SIGNIFICANT CHANGE UP (ref 150–400)
POTASSIUM SERPL-MCNC: 4.3 MMOL/L — SIGNIFICANT CHANGE UP (ref 3.5–5.3)
POTASSIUM SERPL-SCNC: 4.3 MMOL/L — SIGNIFICANT CHANGE UP (ref 3.5–5.3)
RBC # BLD: 4.23 M/UL — SIGNIFICANT CHANGE UP (ref 3.8–5.2)
RBC # FLD: 13.6 % — SIGNIFICANT CHANGE UP (ref 10.3–14.5)
SODIUM SERPL-SCNC: 138 MMOL/L — SIGNIFICANT CHANGE UP (ref 135–145)
SPECIMEN SOURCE: SIGNIFICANT CHANGE UP
WBC # BLD: 7.4 K/UL — SIGNIFICANT CHANGE UP (ref 3.8–10.5)
WBC # FLD AUTO: 7.4 K/UL — SIGNIFICANT CHANGE UP (ref 3.8–10.5)

## 2023-08-01 PROCEDURE — 99232 SBSQ HOSP IP/OBS MODERATE 35: CPT

## 2023-08-01 RX ORDER — AZITHROMYCIN 500 MG/1
500 TABLET, FILM COATED ORAL EVERY 24 HOURS
Refills: 0 | Status: DISCONTINUED | OUTPATIENT
Start: 2023-08-01 | End: 2023-08-02

## 2023-08-01 RX ORDER — CEFTRIAXONE 500 MG/1
1000 INJECTION, POWDER, FOR SOLUTION INTRAMUSCULAR; INTRAVENOUS EVERY 24 HOURS
Refills: 0 | Status: DISCONTINUED | OUTPATIENT
Start: 2023-08-01 | End: 2023-08-02

## 2023-08-01 RX ADMIN — Medication 1 TABLET(S): at 12:28

## 2023-08-01 RX ADMIN — CEFTRIAXONE 100 MILLIGRAM(S): 500 INJECTION, POWDER, FOR SOLUTION INTRAMUSCULAR; INTRAVENOUS at 18:31

## 2023-08-01 RX ADMIN — SIMVASTATIN 10 MILLIGRAM(S): 20 TABLET, FILM COATED ORAL at 18:30

## 2023-08-01 RX ADMIN — ENOXAPARIN SODIUM 30 MILLIGRAM(S): 100 INJECTION SUBCUTANEOUS at 12:29

## 2023-08-01 NOTE — PHYSICAL THERAPY INITIAL EVALUATION ADULT - PERTINENT HX OF CURRENT PROBLEM, REHAB EVAL
89yo F w/ PMH of HLD on Simvastatin and DVT in 2015 no longer on AC p/w CP and cough found to be septic i/s/o LLL CAP.    7/30/2023 XR CHEST: Hazy opacity in left lower lung field. This may represent atelectasis or pneumonia.

## 2023-08-01 NOTE — DISCHARGE NOTE NURSING/CASE MANAGEMENT/SOCIAL WORK - NSDCPEFALRISK_GEN_ALL_CORE
For information on Fall & Injury Prevention, visit: [email or web address] OR  [email or web address] OR  https://www.cdc.gov/steadi/patient.html

## 2023-08-01 NOTE — PHYSICAL THERAPY INITIAL EVALUATION ADULT - BALANCE TRAINING, PT EVAL
GOAL: Pt will demonstrate improved static/dynamic standing balance by 1/2 balance grade, in order to improve stability, decrease fall risk and increase independence with transfers and ambulation within 3 weeks.

## 2023-08-01 NOTE — PROGRESS NOTE ADULT - PROBLEM SELECTOR PLAN 1
- Febrile to 101 w/ leukocytosis to 13 and imaging w/ LLL opacity  - s/p CTX and Azithro in the ED, will c/w CTX 1g qd and Azithro 500mg qd  - MRSA neg  - f/u sputum cx if able to obtain  - UA neg, blood cx pending  - Of note pt reports difficulty w/ swallowing large pills (including prior abx)- If transition to PO would find liquid alternative or discuss pill size w/ pharm prior to prescription
- Febrile to 101 w/ leukocytosis to 13 and imaging w/ LLL opacity  - w/ productive cough c/f CAP  - s/p CTX and Azithro in the ED, will c/w CTX 1g qd and Azithro 500mg qd  - f/u MRSA PCR (no risk factors, will hold on Vanco for now)  - f/u sputum cx if able to obtain  - f/u UA and blood cx  - Of note pt reports difficulty w/ swallowing large pills (including prior abx)- If transition to PO would find liquid alternative or discuss pill size w/ pharm prior to prescription

## 2023-08-01 NOTE — PHYSICAL THERAPY INITIAL EVALUATION ADULT - PLANNED THERAPY INTERVENTIONS, PT EVAL
Goal: Patient will negotiate up and down 5 steps with unilateral rail independently, within 3 weeks./balance training/gait training

## 2023-08-01 NOTE — PROGRESS NOTE ADULT - PROBLEM SELECTOR PLAN 3
- Hx of DVT in 2015 noted on medical chart s/p course of Xarelto  - Currently w/out calf tenderness and no edema, warmth or erythema  - Given current symptoms of acute onset pleuritic CP, wells score for PE is 1.5, this less likely  - Will hold off on CTA given normotensive and not hypoxic w/ low probability  - b/l LE duplex to r/o DVT, neg
- Hx of DVT in 2015 noted on medical chart s/p course of Xarelto  - Currently w/out calf tenderness and no edema, warmth or erythema  - Given current symptoms of acute onset pleuritic CP, wells score for PE is 1.5, this less likely  - Will hold off on CTA given normotensive and not hypoxic w/ low probability  - Ordered b/l LE duplex to r/o DVT, f/u

## 2023-08-01 NOTE — PROGRESS NOTE ADULT - PROBLEM SELECTOR PLAN 4
DVT ppx: Lovenox 30mg given CrCl  Diet: Easy to chew DASH  Dispo: Pending blood cultures  PT consult
DVT ppx: Lovenox 30mg given CrCl  Diet: Easy to chew DASH  Dispo: Pending clinical improvement

## 2023-08-01 NOTE — DISCHARGE NOTE NURSING/CASE MANAGEMENT/SOCIAL WORK - PATIENT PORTAL LINK FT
You can access the FollowMyHealth Patient Portal offered by [facility] by registering at the following website: [email or web address]. By joining [employer]’s FollowMyHealth portal, you will also be able to view your health information using other applications (apps) compatible with our system.

## 2023-08-01 NOTE — PROGRESS NOTE ADULT - SUBJECTIVE AND OBJECTIVE BOX
PROGRESS NOTE:   Authored by Dr. Bharat Rankin MD, Available on MS Teams    Patient is a 89y old  Female who presents with a chief complaint of CP (31 Jul 2023 15:46)      SUBJECTIVE / OVERNIGHT EVENTS: Patient continues to have cough but no phlegm. No chest pain or shortness of breath    ADDITIONAL REVIEW OF SYSTEMS:    MEDICATIONS  (STANDING):  azithromycin  IVPB 500 milliGRAM(s) IV Intermittent every 24 hours  cefTRIAXone   IVPB 1000 milliGRAM(s) IV Intermittent every 24 hours  enoxaparin Injectable 30 milliGRAM(s) SubCutaneous every 24 hours  lactobacillus acidophilus 1 Tablet(s) Oral daily  simvastatin 10 milliGRAM(s) Oral at bedtime    MEDICATIONS  (PRN):  acetaminophen     Tablet .. 650 milliGRAM(s) Oral every 6 hours PRN Temp greater or equal to 38C (100.4F), Mild Pain (1 - 3)  benzonatate 100 milliGRAM(s) Oral every 8 hours PRN Cough  guaifenesin/dextromethorphan Oral Liquid 10 milliLiter(s) Oral every 4 hours PRN Cough      CAPILLARY BLOOD GLUCOSE        I&O's Summary    31 Jul 2023 07:01  -  01 Aug 2023 07:00  --------------------------------------------------------  IN: 960 mL / OUT: 0 mL / NET: 960 mL    01 Aug 2023 07:01  -  01 Aug 2023 13:41  --------------------------------------------------------  IN: 240 mL / OUT: 0 mL / NET: 240 mL        PHYSICAL EXAM:  Vital Signs Last 24 Hrs  T(C): 36.8 (01 Aug 2023 05:29), Max: 36.8 (01 Aug 2023 05:29)  T(F): 98.2 (01 Aug 2023 05:29), Max: 98.2 (01 Aug 2023 05:29)  HR: 69 (01 Aug 2023 05:29) (69 - 89)  BP: 132/83 (01 Aug 2023 05:29) (132/83 - 136/71)  BP(mean): --  RR: 18 (01 Aug 2023 05:29) (17 - 18)  SpO2: 94% (01 Aug 2023 05:29) (94% - 95%)    Parameters below as of 01 Aug 2023 05:29  Patient On (Oxygen Delivery Method): room air        CONSTITUTIONAL: NAD, well-developed  RESPIRATORY: Normal respiratory effort; lungs are clear to auscultation bilaterally  CARDIOVASCULAR: Regular rate and rhythm, normal S1 and S2, no murmur/rub/gallop; No lower extremity edema  ABDOMEN: Nontender to palpation, normoactive bowel sounds, no rebound/guarding  MUSCLOSKELETAL: no clubbing or cyanosis of digits; no joint swelling or tenderness to palpation  PSYCH: A+O to person, place, and time; affect appropriate    LABS:                        12.9   7.40  )-----------( 181      ( 01 Aug 2023 08:27 )             39.9     08-01    138  |  99  |  19  ----------------------------<  110<H>  4.3   |  24  |  0.96    Ca    9.7      01 Aug 2023 08:27  Phos  3.1     08-01  Mg     2.1     08-01    TPro  8.2  /  Alb  4.4  /  TBili  0.5  /  DBili  x   /  AST  27  /  ALT  16  /  AlkPhos  87  07-30          Urinalysis Basic - ( 01 Aug 2023 08:27 )    Color: x / Appearance: x / SG: x / pH: x  Gluc: 110 mg/dL / Ketone: x  / Bili: x / Urobili: x   Blood: x / Protein: x / Nitrite: x   Leuk Esterase: x / RBC: x / WBC x   Sq Epi: x / Non Sq Epi: x / Bacteria: x        Culture - Urine (collected 31 Jul 2023 12:53)  Source: Clean Catch Clean Catch (Midstream)  Final Report (01 Aug 2023 13:11):    <10,000 CFU/mL Normal Urogenital Mago
PROGRESS NOTE:   Authored by Dr. Bharat Rankin MD, Available on MS Teams    Patient is a 89y old  Female who presents with a chief complaint of CP (2023 01:26)      SUBJECTIVE / OVERNIGHT EVENTS: Patient continues to have a cough. No chest pain. Has some intermittent abdominal pain. Starting to have diarrhea.     ADDITIONAL REVIEW OF SYSTEMS:    MEDICATIONS  (STANDING):  azithromycin  IVPB 500 milliGRAM(s) IV Intermittent every 24 hours  cefTRIAXone   IVPB 1000 milliGRAM(s) IV Intermittent every 24 hours  enoxaparin Injectable 30 milliGRAM(s) SubCutaneous every 24 hours  lactobacillus acidophilus 1 Tablet(s) Oral daily  simvastatin 10 milliGRAM(s) Oral at bedtime    MEDICATIONS  (PRN):  acetaminophen     Tablet .. 650 milliGRAM(s) Oral every 6 hours PRN Temp greater or equal to 38C (100.4F), Mild Pain (1 - 3)  benzonatate 100 milliGRAM(s) Oral every 8 hours PRN Cough  guaifenesin/dextromethorphan Oral Liquid 10 milliLiter(s) Oral every 4 hours PRN Cough      CAPILLARY BLOOD GLUCOSE        I&O's Summary    2023 07:01  -  2023 07:00  --------------------------------------------------------  IN: 120 mL / OUT: 0 mL / NET: 120 mL        PHYSICAL EXAM:  Vital Signs Last 24 Hrs  T(C): 36.6 (2023 13:12), Max: 38.4 (2023 21:33)  T(F): 97.9 (2023 13:12), Max: 101.1 (2023 21:33)  HR: 71 (2023 13:12) (71 - 90)  BP: 131/59 (2023 13:12) (109/67 - 196/92)  BP(mean): --  RR: 17 (2023 13:12) (17 - 18)  SpO2: 95% (2023 13:12) (95% - 98%)    Parameters below as of 2023 13:12  Patient On (Oxygen Delivery Method): room air        CONSTITUTIONAL: NAD  RESPIRATORY: Normal respiratory effort; lungs are clear to auscultation bilaterally  CARDIOVASCULAR: Regular rate and rhythm, normal S1 and S2, no murmur/rub/gallop; No lower extremity edema  ABDOMEN: Nontender to palpation, normoactive bowel sounds, no rebound/guarding  MUSCLOSKELETAL: no clubbing or cyanosis of digits; no joint swelling or tenderness to palpation  PSYCH: A+O to person, place, and time; affect appropriate    LABS:                        13.4   13.58 )-----------( 210      ( 2023 20:35 )             41.9         136  |  97  |  24<H>  ----------------------------<  120<H>  4.4   |  24  |  1.18    Ca    9.9      2023 20:35    TPro  8.2  /  Alb  4.4  /  TBili  0.5  /  DBili  x   /  AST  27  /  ALT  16  /  AlkPhos  87            Urinalysis Basic - ( 2023 12:53 )    Color: Light Yellow / Appearance: Clear / S.010 / pH: x  Gluc: x / Ketone: Negative  / Bili: Negative / Urobili: Negative   Blood: x / Protein: Negative / Nitrite: Negative   Leuk Esterase: Negative / RBC: x / WBC x   Sq Epi: x / Non Sq Epi: x / Bacteria: x          RADIOLOGY & ADDITIONAL TESTS:  Results Reviewed:   Imaging Personally Reviewed:  Electrocardiogram Personally Reviewed:    COORDINATION OF CARE:  Care Discussed with Consultants/Other Providers [Y/N]:  Prior or Outpatient Records Reviewed [Y/N]:

## 2023-08-01 NOTE — PHYSICAL THERAPY INITIAL EVALUATION ADULT - ADDITIONAL COMMENTS
Pt lives alone in private home with 4 stairs to entrance with bilateral handrails and 3 steps inside to bedroom with bilateral HR. Prior to admission pt independent with all functional mobility including ambulation with 3-wheeled walker, also with additional walkers in home, and has assist of family or HHA for ADL's. Pt with HHA 9am-5pm x 5 days per week. Pt also with walk-in shower and shower chair.

## 2023-08-01 NOTE — PATIENT PROFILE ADULT - FALL HARM RISK - HARM RISK INTERVENTIONS

## 2023-08-02 ENCOUNTER — TRANSCRIPTION ENCOUNTER (OUTPATIENT)
Age: 88
End: 2023-08-02

## 2023-08-02 VITALS
RESPIRATION RATE: 18 BRPM | SYSTOLIC BLOOD PRESSURE: 118 MMHG | DIASTOLIC BLOOD PRESSURE: 68 MMHG | OXYGEN SATURATION: 95 % | HEART RATE: 81 BPM | TEMPERATURE: 98 F

## 2023-08-02 PROBLEM — E78.5 HYPERLIPIDEMIA, UNSPECIFIED: Chronic | Status: ACTIVE | Noted: 2023-07-31

## 2023-08-02 PROCEDURE — 94640 AIRWAY INHALATION TREATMENT: CPT

## 2023-08-02 PROCEDURE — 80053 COMPREHEN METABOLIC PANEL: CPT

## 2023-08-02 PROCEDURE — 93005 ELECTROCARDIOGRAM TRACING: CPT

## 2023-08-02 PROCEDURE — 85025 COMPLETE CBC W/AUTO DIFF WBC: CPT

## 2023-08-02 PROCEDURE — 87086 URINE CULTURE/COLONY COUNT: CPT

## 2023-08-02 PROCEDURE — 87449 NOS EACH ORGANISM AG IA: CPT

## 2023-08-02 PROCEDURE — 83690 ASSAY OF LIPASE: CPT

## 2023-08-02 PROCEDURE — 82803 BLOOD GASES ANY COMBINATION: CPT

## 2023-08-02 PROCEDURE — 93970 EXTREMITY STUDY: CPT

## 2023-08-02 PROCEDURE — 36000 PLACE NEEDLE IN VEIN: CPT

## 2023-08-02 PROCEDURE — 97161 PT EVAL LOW COMPLEX 20 MIN: CPT

## 2023-08-02 PROCEDURE — 84100 ASSAY OF PHOSPHORUS: CPT

## 2023-08-02 PROCEDURE — 99239 HOSP IP/OBS DSCHRG MGMT >30: CPT

## 2023-08-02 PROCEDURE — 84145 PROCALCITONIN (PCT): CPT

## 2023-08-02 PROCEDURE — 82947 ASSAY GLUCOSE BLOOD QUANT: CPT

## 2023-08-02 PROCEDURE — 87640 STAPH A DNA AMP PROBE: CPT

## 2023-08-02 PROCEDURE — 80048 BASIC METABOLIC PNL TOTAL CA: CPT

## 2023-08-02 PROCEDURE — 71045 X-RAY EXAM CHEST 1 VIEW: CPT

## 2023-08-02 PROCEDURE — 85014 HEMATOCRIT: CPT

## 2023-08-02 PROCEDURE — 83605 ASSAY OF LACTIC ACID: CPT

## 2023-08-02 PROCEDURE — 85027 COMPLETE CBC AUTOMATED: CPT

## 2023-08-02 PROCEDURE — 84484 ASSAY OF TROPONIN QUANT: CPT

## 2023-08-02 PROCEDURE — 82435 ASSAY OF BLOOD CHLORIDE: CPT

## 2023-08-02 PROCEDURE — 85018 HEMOGLOBIN: CPT

## 2023-08-02 PROCEDURE — 82330 ASSAY OF CALCIUM: CPT

## 2023-08-02 PROCEDURE — 81003 URINALYSIS AUTO W/O SCOPE: CPT

## 2023-08-02 PROCEDURE — 87040 BLOOD CULTURE FOR BACTERIA: CPT

## 2023-08-02 PROCEDURE — 87641 MR-STAPH DNA AMP PROBE: CPT

## 2023-08-02 PROCEDURE — 84132 ASSAY OF SERUM POTASSIUM: CPT

## 2023-08-02 PROCEDURE — 36415 COLL VENOUS BLD VENIPUNCTURE: CPT

## 2023-08-02 PROCEDURE — 99285 EMERGENCY DEPT VISIT HI MDM: CPT

## 2023-08-02 PROCEDURE — 83735 ASSAY OF MAGNESIUM: CPT

## 2023-08-02 PROCEDURE — 84295 ASSAY OF SERUM SODIUM: CPT

## 2023-08-02 PROCEDURE — 87070 CULTURE OTHR SPECIMN AEROBIC: CPT

## 2023-08-02 RX ORDER — GUAIFENESIN/DEXTROMETHORPHAN 600MG-30MG
10 TABLET, EXTENDED RELEASE 12 HR ORAL
Qty: 20 | Refills: 0
Start: 2023-08-02 | End: 2023-08-06

## 2023-08-02 RX ORDER — SIMVASTATIN 20 MG/1
1 TABLET, FILM COATED ORAL
Qty: 0 | Refills: 0 | DISCHARGE
Start: 2023-08-02

## 2023-08-02 RX ORDER — IPRATROPIUM/ALBUTEROL SULFATE 18-103MCG
3 AEROSOL WITH ADAPTER (GRAM) INHALATION ONCE
Refills: 0 | Status: COMPLETED | OUTPATIENT
Start: 2023-08-02 | End: 2023-08-02

## 2023-08-02 RX ADMIN — ENOXAPARIN SODIUM 30 MILLIGRAM(S): 100 INJECTION SUBCUTANEOUS at 12:37

## 2023-08-02 RX ADMIN — Medication 3 MILLILITER(S): at 11:20

## 2023-08-02 RX ADMIN — Medication 1 TABLET(S): at 12:37

## 2023-08-02 NOTE — DISCHARGE NOTE PROVIDER - CARE PROVIDER_API CALL
Bhavesh Bateman  Pulmonary Disease  [address], [address]  [place] [address]  Phone: [phone]  Fax: [phone]  Follow Up Time: 1 week

## 2023-08-02 NOTE — DISCHARGE NOTE PROVIDER - NSDCMRMEDTOKEN_GEN_ALL_CORE_FT
amoxicillin-clavulanate 400 mg-57 mg/5 mL oral liquid: 5 milliliter(s) orally 2 times a day  guaiFENesin-dextromethorphan 100 mg-10 mg/5 mL oral liquid: 10 milliliter(s) orally every 6 hours as needed for Cough  simvastatin 10 mg oral tablet: 1 tab(s) orally once a day (at bedtime)   amoxicillin-clavulanate 400 mg-57 mg/5 mL oral liquid: 5 milliliter(s) orally 2 times a day  guaiFENesin-dextromethorphan 100 mg-10 mg/5 mL oral liquid: 10 milliliter(s) orally every 6 hours as needed for Cough  Outpatient physical therapy for weakness: weakness  simvastatin 10 mg oral tablet: 1 tab(s) orally once a day (at bedtime)

## 2023-08-02 NOTE — DISCHARGE NOTE PROVIDER - NSDCCPCAREPLAN_GEN_ALL_CORE_FT
PRINCIPAL DISCHARGE DIAGNOSIS  Diagnosis: PNA (pneumonia)  Assessment and Plan of Treatment: Pneumonia is a lung infection that can cause a fever, cough, and trouble breathing.  Continue all antibiotics as ordered until complete.  Nutrition is important, eat small frequent meals.  Get lots of rest and drink fluids.  Call your health care provider upon arrival home from hospital and make a follow up appointment for one week.  If your cough worsens, you develop fever greater than 101', you have shaking chills, a fast heartbeat, trouble breathing and/or feel your are breathing much faster than usual, call your healthcare provider.  Make sure you wash your hands frequently.

## 2023-08-02 NOTE — DISCHARGE NOTE PROVIDER - ATTENDING DISCHARGE PHYSICAL EXAMINATION:
PHYSICAL EXAM:    Vital Signs Last 24 Hrs  T(C): 36.4 (02 Aug 2023 13:51), Max: 36.7 (01 Aug 2023 21:47)  T(F): 97.6 (02 Aug 2023 13:51), Max: 98.1 (02 Aug 2023 05:43)  HR: 81 (02 Aug 2023 13:51) (64 - 81)  BP: 118/68 (02 Aug 2023 13:51) (118/68 - 150/64)  BP(mean): --  RR: 18 (02 Aug 2023 13:51) (18 - 18)  SpO2: 95% (02 Aug 2023 13:51) (95% - 97%)    General: No acute distress.  HEENT: No scleral icterus or injection.   Neck: Supple.  Full ROM.   Heart: RRR.  Normal S1 and S2.  No murmurs, rubs, or gallops.   Lungs: Breathing comfortably, No wheezes  Abdomen: BS+, soft, NT/ND.   Skin: Warm and dry.  No rashes.  Extremities: No edema, clubbing, or cyanosis.   Musculoskeletal: No deformities  Neuro: A&Ox3. Moving all extremities

## 2023-08-02 NOTE — DISCHARGE NOTE PROVIDER - NSDCFUSCHEDAPPT_GEN_ALL_CORE_FT
Joss Annmarie  [facility] [address]  Scheduled Appointment: 08/08/2023    Marliijeoma Annmarie  [facility] [address]  Scheduled Appointment: 09/18/2023

## 2023-08-02 NOTE — DISCHARGE NOTE PROVIDER - HOSPITAL COURSE
89yo F w/ PMH of HLD on Simvastatin and DVT in 2015 no longer on AC pw CP and cough found to be septic i/s/o LLL CAP. You had pneumonia and received intravenous antibiotics and now feels better and symptoms improved . Patient is medically cleared for discharge and follow up outpatient  with pulmonary and PCP. Please continue your antibiotics as ordered and follow up with PCP in 1-3 days.         89yo F w/ PMH of HLD on Simvastatin and DVT in 2015 no longer on AC pw CP and cough found to be septic i/s/o LLL CAP. You had pneumonia and received intravenous antibiotics and now feels better and symptoms improved . Patient is medically cleared for discharge and follow up outpatient  with pulmonary and PCP. Please continue your antibiotics as ordered and follow up with PCP in 1-3 days.    Discussed plan with son and aide at bedside

## 2023-08-05 LAB
CULTURE RESULTS: SIGNIFICANT CHANGE UP
CULTURE RESULTS: SIGNIFICANT CHANGE UP
SPECIMEN SOURCE: SIGNIFICANT CHANGE UP
SPECIMEN SOURCE: SIGNIFICANT CHANGE UP

## 2023-08-08 ENCOUNTER — APPOINTMENT (OUTPATIENT)
Dept: FAMILY MEDICINE | Facility: CLINIC | Age: 88
End: 2023-08-08
Payer: MEDICARE

## 2023-08-08 VITALS
HEART RATE: 81 BPM | RESPIRATION RATE: 16 BRPM | HEIGHT: 64 IN | SYSTOLIC BLOOD PRESSURE: 145 MMHG | DIASTOLIC BLOOD PRESSURE: 90 MMHG | TEMPERATURE: 96.8 F | WEIGHT: 116 LBS | BODY MASS INDEX: 19.81 KG/M2 | OXYGEN SATURATION: 95 %

## 2023-08-08 DIAGNOSIS — R91.8 OTHER NONSPECIFIC ABNORMAL FINDING OF LUNG FIELD: ICD-10-CM

## 2023-08-08 PROCEDURE — 99214 OFFICE O/P EST MOD 30 MIN: CPT

## 2023-08-08 NOTE — HISTORY OF PRESENT ILLNESS
[FreeTextEntry8] : 89 y/o F PMHx Bronchiectasis accompanied by HHA presents  for f/up after hospital admission. Pt c/o acute "jab in lung" causing SOB. Lasted 30-60 minutes. Diagnosed  Left PNA. Tmax 101. Completed  Augmentin BID Feels better. Seeing Pulmonary next week

## 2023-08-08 NOTE — PHYSICAL EXAM
[Normal] : normal rate, regular rhythm, normal S1 and S2 and no murmur heard [de-identified] : decreased breath sounds L>R

## 2023-08-11 RX ORDER — AMOXICILLIN AND CLAVULANATE POTASSIUM 400; 57 MG/5ML; MG/5ML
400-57 POWDER, FOR SUSPENSION ORAL
Qty: 1 | Refills: 0 | Status: ACTIVE | COMMUNITY
Start: 2023-08-11 | End: 1900-01-01

## 2023-09-18 ENCOUNTER — APPOINTMENT (OUTPATIENT)
Dept: FAMILY MEDICINE | Facility: CLINIC | Age: 88
End: 2023-09-18
Payer: MEDICARE

## 2023-09-18 VITALS
RESPIRATION RATE: 16 BRPM | DIASTOLIC BLOOD PRESSURE: 75 MMHG | OXYGEN SATURATION: 98 % | WEIGHT: 106 LBS | TEMPERATURE: 96 F | BODY MASS INDEX: 18.1 KG/M2 | SYSTOLIC BLOOD PRESSURE: 140 MMHG | HEIGHT: 64 IN | HEART RATE: 80 BPM

## 2023-09-18 DIAGNOSIS — R63.4 ABNORMAL WEIGHT LOSS: ICD-10-CM

## 2023-09-18 DIAGNOSIS — Z71.85 ENCOUNTER FOR IMMUNIZATION SAFETY COUNSELING: ICD-10-CM

## 2023-09-18 DIAGNOSIS — J18.9 PNEUMONIA, UNSPECIFIED ORGANISM: ICD-10-CM

## 2023-09-18 PROCEDURE — 99215 OFFICE O/P EST HI 40 MIN: CPT

## 2023-10-23 ENCOUNTER — APPOINTMENT (OUTPATIENT)
Dept: FAMILY MEDICINE | Facility: CLINIC | Age: 88
End: 2023-10-23
Payer: MEDICARE

## 2023-10-23 PROCEDURE — 90686 IIV4 VACC NO PRSV 0.5 ML IM: CPT

## 2023-10-23 PROCEDURE — G0008: CPT

## 2023-10-30 ENCOUNTER — NON-APPOINTMENT (OUTPATIENT)
Age: 88
End: 2023-10-30

## 2023-10-31 ENCOUNTER — NON-APPOINTMENT (OUTPATIENT)
Age: 88
End: 2023-10-31

## 2023-10-31 ENCOUNTER — APPOINTMENT (OUTPATIENT)
Dept: CARDIOLOGY | Facility: CLINIC | Age: 88
End: 2023-10-31
Payer: MEDICARE

## 2023-10-31 VITALS
WEIGHT: 115 LBS | HEART RATE: 77 BPM | BODY MASS INDEX: 19.74 KG/M2 | OXYGEN SATURATION: 99 % | DIASTOLIC BLOOD PRESSURE: 78 MMHG | SYSTOLIC BLOOD PRESSURE: 166 MMHG

## 2023-10-31 DIAGNOSIS — R79.89 OTHER SPECIFIED ABNORMAL FINDINGS OF BLOOD CHEMISTRY: ICD-10-CM

## 2023-10-31 DIAGNOSIS — K21.9 GASTRO-ESOPHAGEAL REFLUX DISEASE W/OUT ESOPHAGITIS: ICD-10-CM

## 2023-10-31 DIAGNOSIS — R70.0 ELEVATED ERYTHROCYTE SEDIMENTATION RATE: ICD-10-CM

## 2023-10-31 DIAGNOSIS — Z86.718 PERSONAL HISTORY OF OTHER VENOUS THROMBOSIS AND EMBOLISM: ICD-10-CM

## 2023-10-31 DIAGNOSIS — R06.02 SHORTNESS OF BREATH: ICD-10-CM

## 2023-10-31 PROCEDURE — 99214 OFFICE O/P EST MOD 30 MIN: CPT | Mod: 25

## 2023-10-31 PROCEDURE — 93000 ELECTROCARDIOGRAM COMPLETE: CPT

## 2023-10-31 RX ORDER — AMLODIPINE BESYLATE 2.5 MG/1
2.5 TABLET ORAL
Qty: 90 | Refills: 1 | Status: ACTIVE | COMMUNITY
Start: 2023-10-31 | End: 1900-01-01

## 2023-11-14 ENCOUNTER — NON-APPOINTMENT (OUTPATIENT)
Age: 88
End: 2023-11-14

## 2023-11-21 ENCOUNTER — APPOINTMENT (OUTPATIENT)
Dept: FAMILY MEDICINE | Facility: CLINIC | Age: 88
End: 2023-11-21

## 2024-02-05 ENCOUNTER — APPOINTMENT (OUTPATIENT)
Dept: FAMILY MEDICINE | Facility: CLINIC | Age: 89
End: 2024-02-05
Payer: MEDICARE

## 2024-02-05 VITALS
WEIGHT: 115 LBS | DIASTOLIC BLOOD PRESSURE: 75 MMHG | TEMPERATURE: 97.8 F | HEIGHT: 64 IN | SYSTOLIC BLOOD PRESSURE: 140 MMHG | OXYGEN SATURATION: 96 % | BODY MASS INDEX: 19.63 KG/M2 | RESPIRATION RATE: 16 BRPM | HEART RATE: 78 BPM

## 2024-02-05 DIAGNOSIS — R35.0 FREQUENCY OF MICTURITION: ICD-10-CM

## 2024-02-05 DIAGNOSIS — R45.1 RESTLESSNESS AND AGITATION: ICD-10-CM

## 2024-02-05 DIAGNOSIS — I10 ESSENTIAL (PRIMARY) HYPERTENSION: ICD-10-CM

## 2024-02-05 DIAGNOSIS — F41.9 ANXIETY DISORDER, UNSPECIFIED: ICD-10-CM

## 2024-02-05 PROCEDURE — G2211 COMPLEX E/M VISIT ADD ON: CPT

## 2024-02-05 PROCEDURE — 99215 OFFICE O/P EST HI 40 MIN: CPT

## 2024-02-05 NOTE — REVIEW OF SYSTEMS
[Fatigue] : fatigue [Recent Change In Weight] : ~T no recent weight change [Shortness Of Breath] : shortness of breath [Nocturia] : nocturia [Negative] : Cardiovascular [de-identified] : bruna

## 2024-02-05 NOTE — PHYSICAL EXAM
[Normal] : affect was normal and insight and judgment were intact [de-identified] : diffuse rhoinchi [de-identified] : walks with walker

## 2024-02-05 NOTE — HISTORY OF PRESENT ILLNESS
[de-identified] : 89 y/o F PMHx HTN, Bronchiectasis presents accompanied by her son  for f/up. Pt  states she has increased frequency of urination. Nocturia x 2. Feels jittery  mostly at night (e=when she gets u to go to bathroom)> Fearful of falling. DSAw Cardio 3 months ago who prescribed Amlodipine 2.5mg however patient never started meds

## 2024-02-17 LAB
APPEARANCE: CLEAR
BACTERIA: NEGATIVE /HPF
BILIRUBIN URINE: NEGATIVE
BLOOD URINE: NEGATIVE
CAST: 0 /LPF
COLOR: YELLOW
EPITHELIAL CELLS: 3 /HPF
GLUCOSE QUALITATIVE U: NEGATIVE MG/DL
KETONES URINE: NEGATIVE MG/DL
LEUKOCYTE ESTERASE URINE: ABNORMAL
MICROSCOPIC-UA: NORMAL
NITRITE URINE: NEGATIVE
PH URINE: 7
PROTEIN URINE: NEGATIVE MG/DL
RED BLOOD CELLS URINE: 0 /HPF
SPECIFIC GRAVITY URINE: 1.01
UROBILINOGEN URINE: 0.2 MG/DL
WHITE BLOOD CELLS URINE: 1 /HPF

## 2024-02-18 LAB — BACTERIA UR CULT: NORMAL

## 2024-03-19 ENCOUNTER — NON-APPOINTMENT (OUTPATIENT)
Age: 89
End: 2024-03-19

## 2024-04-12 ENCOUNTER — RX RENEWAL (OUTPATIENT)
Age: 89
End: 2024-04-12

## 2024-06-22 ENCOUNTER — TRANSCRIPTION ENCOUNTER (OUTPATIENT)
Age: 89
End: 2024-06-22

## 2024-07-01 ENCOUNTER — APPOINTMENT (OUTPATIENT)
Dept: FAMILY MEDICINE | Facility: CLINIC | Age: 89
End: 2024-07-01
Payer: MEDICARE

## 2024-07-01 VITALS
HEIGHT: 64 IN | BODY MASS INDEX: 19.12 KG/M2 | DIASTOLIC BLOOD PRESSURE: 79 MMHG | WEIGHT: 112 LBS | TEMPERATURE: 97.5 F | OXYGEN SATURATION: 98 % | HEART RATE: 73 BPM | SYSTOLIC BLOOD PRESSURE: 120 MMHG | RESPIRATION RATE: 16 BRPM

## 2024-07-01 DIAGNOSIS — K59.09 OTHER CONSTIPATION: ICD-10-CM

## 2024-07-01 DIAGNOSIS — J47.9 BRONCHIECTASIS, UNCOMPLICATED: ICD-10-CM

## 2024-07-01 DIAGNOSIS — R10.84 GENERALIZED ABDOMINAL PAIN: ICD-10-CM

## 2024-07-01 DIAGNOSIS — R01.1 CARDIAC MURMUR, UNSPECIFIED: ICD-10-CM

## 2024-07-01 PROCEDURE — 99215 OFFICE O/P EST HI 40 MIN: CPT

## 2024-07-01 PROCEDURE — G2211 COMPLEX E/M VISIT ADD ON: CPT

## 2024-07-23 ENCOUNTER — APPOINTMENT (OUTPATIENT)
Dept: FAMILY MEDICINE | Facility: CLINIC | Age: 89
End: 2024-07-23

## 2024-08-14 ENCOUNTER — APPOINTMENT (OUTPATIENT)
Dept: CARDIOLOGY | Facility: CLINIC | Age: 89
End: 2024-08-14
Payer: MEDICARE

## 2024-08-14 ENCOUNTER — NON-APPOINTMENT (OUTPATIENT)
Age: 89
End: 2024-08-14

## 2024-08-14 VITALS
HEART RATE: 67 BPM | WEIGHT: 102 LBS | SYSTOLIC BLOOD PRESSURE: 183 MMHG | BODY MASS INDEX: 17.51 KG/M2 | OXYGEN SATURATION: 98 % | DIASTOLIC BLOOD PRESSURE: 84 MMHG

## 2024-08-14 DIAGNOSIS — R09.89 OTHER SPECIFIED SYMPTOMS AND SIGNS INVOLVING THE CIRCULATORY AND RESPIRATORY SYSTEMS: ICD-10-CM

## 2024-08-14 DIAGNOSIS — F41.9 ANXIETY DISORDER, UNSPECIFIED: ICD-10-CM

## 2024-08-14 DIAGNOSIS — K59.09 OTHER CONSTIPATION: ICD-10-CM

## 2024-08-14 DIAGNOSIS — F32.A DEPRESSION, UNSPECIFIED: ICD-10-CM

## 2024-08-14 DIAGNOSIS — I10 ESSENTIAL (PRIMARY) HYPERTENSION: ICD-10-CM

## 2024-08-14 DIAGNOSIS — R06.02 SHORTNESS OF BREATH: ICD-10-CM

## 2024-08-14 DIAGNOSIS — R00.2 PALPITATIONS: ICD-10-CM

## 2024-08-14 DIAGNOSIS — N28.9 DISORDER OF KIDNEY AND URETER, UNSPECIFIED: ICD-10-CM

## 2024-08-14 PROCEDURE — 93000 ELECTROCARDIOGRAM COMPLETE: CPT

## 2024-08-14 PROCEDURE — G2211 COMPLEX E/M VISIT ADD ON: CPT

## 2024-08-14 PROCEDURE — 99214 OFFICE O/P EST MOD 30 MIN: CPT

## 2024-08-14 NOTE — REASON FOR VISIT
[FreeTextEntry1] : Shadi agustin now 89 years old here for follow-up of her cardiac status.  She was seen on August 14, 2024.  She was last seen by me in October 2023

## 2024-08-14 NOTE — PHYSICAL EXAM
[Well Developed] : well developed [Well Nourished] : well nourished [Normal Conjunctiva] : normal conjunctiva [Normal S1, S2] : normal S1, S2 [No Murmur] : no murmur [Clear Lung Fields] : clear lung fields [Soft] : abdomen soft [Non Tender] : non-tender [Normal Gait] : normal gait [No Edema] : no edema [de-identified] :  sharp fully intact no acute distress anxious about her spikes in blood pressure.  Blood pressure initially was elevated but when I took it was 140/80 both in the right and left arm [de-identified] :  no rales rhonchi or wheezes

## 2024-08-14 NOTE — ASSESSMENT
[FreeTextEntry1] : Shadi Tolentino is an amazing 89-year-old woman with an echo with mild to moderate aortic stenosis preserved LV function perhaps some mitral stenosis only on Zocor now with some headaches hot flashes and spikes in her blood pressure particularly at night.  Initially here her pressure was elevated today but later her blood pressure was 140/75 both in the right and left arm.  She is very concerned about the spikes in blood pressure as has her aide and son.  She is overall healthy and her EKG is unremarkable with perhaps a low atrial rhythm but otherwise within normal limits  She is very concerned about the spikes in her blood pressure.  I have therefore prescribed a low-dose of Norvasc to be taken more towards the night at 2.5 mg.  The aide will keep me informed as to her blood pressure progression  The patient is relatively stable at age 89 in August 2014 with some labile hypertension though later in the exam the blood pressure begins to come down.  She is on no medication other than low-dose statin.  She has nonspecific abdominal pain and constipation.  She has lost some weight and has had somewhat decreased appetite  There are no acute cardiac issues.  The EKG done today on August 14, 2024 his lead reversal otherwise no change from prior EKG

## 2024-08-14 NOTE — DISCUSSION/SUMMARY
[FreeTextEntry1] : I reassured her that her cardiac status was stable she does have labile hypertension but usually her blood pressure is quite normal and giving her any medication is likely to cause her to be dizzy lightheaded etc.  She has good exercise tolerance able to go up and down stairs.  She should continue to be active and I reassured her that her cardiac status was stable on next visit the EKG should be repeated with careful attention to the leads [EKG obtained to assist in diagnosis and management of assessed problem(s)] : EKG obtained to assist in diagnosis and management of assessed problem(s)

## 2024-08-14 NOTE — HISTORY OF PRESENT ILLNESS
[FreeTextEntry1] :  in brief Shadi Tolentino  is amazingly intact 89-year-old with a history of some mild hypertension no diabetes no history of prior cardiac issues. she has a history of hyperlipidemia on simvastatin 10    She has some chronic exertional shortness of breath and a chronic cough and has been diagnosed with GERD as well.    She has been complaining of significant abdominal pain with loose stools.  She had a GI evaluation.  GI did not feel she was a good candidate for colonoscopy.  The GI symptoms seem to be abating   CT of the chest dated 5/9/2023: In comparison with 4/11/2023 stable 1.2 cm nodule opacity within the anterior upper lobe most likely combination of mucoid impaction and atelectasis.  There was an element of coronary calcification   CT of the abdomen April 11, 2023: chest lower bilateral dependent atelectasis bilateral trace effusions diffuse coronary artery atherosclerosis mitral annular calcification liver bile ducts gallbladder pancreas adrenals kidneys were all unremarkable.   patient's present medication including hyoscyamine sulfate and simvastatin 10   EKG dated May 24, 2023: Normal sinus rhythm occasional unifocal PVC otherwise within normal limit and unchanged from EKG from 6 months ago  Visit October 31, 2023: The patient had an echo in May 2023 when I first saw her at which time she had a mildly dilated left atrium normal left ventricular systolic function no segmental abnormality probable moderate diastolic dysfunction she had a calcified aortic valve with decreased opening peak gradient of 12 mean of 6 they calculated the valve area at 1.3 but that seems overly exaggerating the severity of the aortic stenosis she had a heavily calcified mitral annulus with moderate mitral stenosis mild pulmonary hypertension  The patient was 89 and only on small dose of Zocor.  Recently she has noted hot flashes occasional headaches and spikes in her blood pressure up to 170 particularly at night.  She has no chest pain or chest pressure no dizziness or syncope Blood pressure initially here was over 170 but when I took it it was 140/80 both in the right and left arm  Visit August 14, 2024 Overall patient feels well at age 89.  She apparently was hospitalized at [facility] for some nonspecific complaints constipation and was seen in  [facility] and ultimately discharged home.  There were no specific findings  She has intermittent constipation intermittent abdominal pain sometimes on the left side and today some abdominal discomfort across the abdomen.  She is not eating as well as she did in the past.  She has no chest pain chest pressure or significant shortness of breath no edema orthopnea PND  EKG on August 14 probably lead reversal as it is completely different axis from her prior EKG which was within normal limits.  Other than the axis deviation the EKG is otherwise unchanged.  This is undoubtedly related to lead reversal

## 2024-10-04 DIAGNOSIS — R09.82 POSTNASAL DRIP: ICD-10-CM

## 2024-10-04 DIAGNOSIS — R05.8 OTHER SPECIFIED COUGH: ICD-10-CM

## 2024-10-04 RX ORDER — FLUTICASONE PROPIONATE 50 UG/1
50 SPRAY, METERED NASAL TWICE DAILY
Qty: 1 | Refills: 1 | Status: ACTIVE | COMMUNITY
Start: 2024-10-04 | End: 1900-01-01

## 2024-10-04 RX ORDER — ALBUTEROL SULFATE 90 UG/1
108 (90 BASE) INHALANT RESPIRATORY (INHALATION)
Qty: 1 | Refills: 3 | Status: ACTIVE | COMMUNITY
Start: 2024-10-04 | End: 1900-01-01

## 2024-10-07 ENCOUNTER — RESULT REVIEW (OUTPATIENT)
Age: 89
End: 2024-10-07

## 2024-10-07 ENCOUNTER — APPOINTMENT (OUTPATIENT)
Dept: RADIOLOGY | Facility: CLINIC | Age: 89
End: 2024-10-07
Payer: MEDICARE

## 2024-10-07 PROCEDURE — 71045 X-RAY EXAM CHEST 1 VIEW: CPT

## 2024-10-08 ENCOUNTER — TRANSCRIPTION ENCOUNTER (OUTPATIENT)
Age: 89
End: 2024-10-08

## 2024-10-08 RX ORDER — ALBUTEROL SULFATE 2.5 MG/3ML
(2.5 MG/3ML) SOLUTION RESPIRATORY (INHALATION)
Qty: 150 | Refills: 0 | Status: ACTIVE | COMMUNITY
Start: 2024-10-08 | End: 1900-01-01

## 2024-10-18 ENCOUNTER — APPOINTMENT (OUTPATIENT)
Dept: FAMILY MEDICINE | Facility: CLINIC | Age: 89
End: 2024-10-18
Payer: MEDICARE

## 2024-10-18 ENCOUNTER — TRANSCRIPTION ENCOUNTER (OUTPATIENT)
Age: 89
End: 2024-10-18

## 2024-10-18 VITALS
DIASTOLIC BLOOD PRESSURE: 80 MMHG | BODY MASS INDEX: 19.12 KG/M2 | OXYGEN SATURATION: 97 % | RESPIRATION RATE: 16 BRPM | TEMPERATURE: 97.5 F | WEIGHT: 112 LBS | SYSTOLIC BLOOD PRESSURE: 150 MMHG | HEIGHT: 64 IN | HEART RATE: 75 BPM

## 2024-10-18 DIAGNOSIS — R05.9 COUGH, UNSPECIFIED: ICD-10-CM

## 2024-10-18 DIAGNOSIS — M25.473 EFFUSION, UNSPECIFIED ANKLE: ICD-10-CM

## 2024-10-18 DIAGNOSIS — K21.9 GASTRO-ESOPHAGEAL REFLUX DISEASE W/OUT ESOPHAGITIS: ICD-10-CM

## 2024-10-18 DIAGNOSIS — J47.9 BRONCHIECTASIS, UNCOMPLICATED: ICD-10-CM

## 2024-10-18 DIAGNOSIS — E78.5 HYPERLIPIDEMIA, UNSPECIFIED: ICD-10-CM

## 2024-10-18 DIAGNOSIS — I10 ESSENTIAL (PRIMARY) HYPERTENSION: ICD-10-CM

## 2024-10-18 PROCEDURE — 99215 OFFICE O/P EST HI 40 MIN: CPT

## 2024-10-18 PROCEDURE — G2211 COMPLEX E/M VISIT ADD ON: CPT

## 2024-10-21 ENCOUNTER — TRANSCRIPTION ENCOUNTER (OUTPATIENT)
Age: 89
End: 2024-10-21

## 2024-10-28 ENCOUNTER — APPOINTMENT (OUTPATIENT)
Dept: FAMILY MEDICINE | Facility: CLINIC | Age: 89
End: 2024-10-28
Payer: MEDICARE

## 2024-10-28 VITALS
TEMPERATURE: 96.5 F | OXYGEN SATURATION: 96 % | RESPIRATION RATE: 16 BRPM | HEART RATE: 90 BPM | SYSTOLIC BLOOD PRESSURE: 144 MMHG | BODY MASS INDEX: 19.49 KG/M2 | DIASTOLIC BLOOD PRESSURE: 80 MMHG | WEIGHT: 110 LBS | HEIGHT: 63 IN

## 2024-10-28 DIAGNOSIS — Z87.19 PERSONAL HISTORY OF OTHER DISEASES OF THE DIGESTIVE SYSTEM: ICD-10-CM

## 2024-10-28 DIAGNOSIS — L89.90 PRESSURE ULCER OF UNSPECIFIED SITE, UNSPECIFIED STAGE: ICD-10-CM

## 2024-10-28 DIAGNOSIS — R01.1 CARDIAC MURMUR, UNSPECIFIED: ICD-10-CM

## 2024-10-28 DIAGNOSIS — K21.9 GASTRO-ESOPHAGEAL REFLUX DISEASE W/OUT ESOPHAGITIS: ICD-10-CM

## 2024-10-28 DIAGNOSIS — K59.09 OTHER CONSTIPATION: ICD-10-CM

## 2024-10-28 DIAGNOSIS — I10 ESSENTIAL (PRIMARY) HYPERTENSION: ICD-10-CM

## 2024-10-28 DIAGNOSIS — J47.9 BRONCHIECTASIS, UNCOMPLICATED: ICD-10-CM

## 2024-10-28 DIAGNOSIS — Z23 ENCOUNTER FOR IMMUNIZATION: ICD-10-CM

## 2024-10-28 DIAGNOSIS — F32.A DEPRESSION, UNSPECIFIED: ICD-10-CM

## 2024-10-28 PROCEDURE — 99495 TRANSJ CARE MGMT MOD F2F 14D: CPT

## 2024-10-28 PROCEDURE — 90656 IIV3 VACC NO PRSV 0.5 ML IM: CPT

## 2024-10-28 PROCEDURE — G0008: CPT

## 2024-10-28 RX ORDER — GUAIFENESIN/PSEUDOEPHEDRNE HCL 600MG-60MG
TABLET, EXTENDED RELEASE 12 HR ORAL
Qty: 1 | Refills: 1 | Status: ACTIVE | COMMUNITY
Start: 2024-10-28 | End: 1900-01-01

## 2024-12-06 ENCOUNTER — APPOINTMENT (OUTPATIENT)
Dept: FAMILY MEDICINE | Facility: CLINIC | Age: 88
End: 2024-12-06
Payer: MEDICARE

## 2024-12-06 VITALS
SYSTOLIC BLOOD PRESSURE: 130 MMHG | OXYGEN SATURATION: 97 % | HEART RATE: 80 BPM | RESPIRATION RATE: 16 BRPM | HEIGHT: 63 IN | TEMPERATURE: 97.8 F | DIASTOLIC BLOOD PRESSURE: 70 MMHG

## 2024-12-06 DIAGNOSIS — G47.00 INSOMNIA, UNSPECIFIED: ICD-10-CM

## 2024-12-06 DIAGNOSIS — J47.9 BRONCHIECTASIS, UNCOMPLICATED: ICD-10-CM

## 2024-12-06 DIAGNOSIS — K59.09 OTHER CONSTIPATION: ICD-10-CM

## 2024-12-06 DIAGNOSIS — R63.4 ABNORMAL WEIGHT LOSS: ICD-10-CM

## 2024-12-06 DIAGNOSIS — K21.9 GASTRO-ESOPHAGEAL REFLUX DISEASE W/OUT ESOPHAGITIS: ICD-10-CM

## 2024-12-06 DIAGNOSIS — F32.A DEPRESSION, UNSPECIFIED: ICD-10-CM

## 2024-12-06 DIAGNOSIS — R05.9 COUGH, UNSPECIFIED: ICD-10-CM

## 2024-12-06 PROCEDURE — G2211 COMPLEX E/M VISIT ADD ON: CPT

## 2024-12-06 PROCEDURE — 99215 OFFICE O/P EST HI 40 MIN: CPT

## 2024-12-13 ENCOUNTER — TRANSCRIPTION ENCOUNTER (OUTPATIENT)
Age: 88
End: 2024-12-13

## 2024-12-13 DIAGNOSIS — R23.2 FLUSHING: ICD-10-CM

## 2024-12-17 ENCOUNTER — TRANSCRIPTION ENCOUNTER (OUTPATIENT)
Age: 88
End: 2024-12-17

## 2024-12-17 LAB
T3 SERPL-MCNC: 73 NG/DL
T4 FREE SERPL-MCNC: 1.5 NG/DL
T4 SERPL-MCNC: 8.1 UG/DL
TSH SERPL-ACNC: 1.74 UIU/ML

## 2024-12-20 ENCOUNTER — RX RENEWAL (OUTPATIENT)
Age: 88
End: 2024-12-20

## 2025-01-10 ENCOUNTER — APPOINTMENT (OUTPATIENT)
Dept: FAMILY MEDICINE | Facility: CLINIC | Age: 89
End: 2025-01-10

## 2025-03-10 NOTE — PATIENT PROFILE ADULT - FALL HARM RISK - FALLEN IN PAST
We are committed to providing you with the best care possible.   In order to help us achieve these goals please remember to bring all medications, herbal products, and over the counter supplements with you to each visit.     If your provider has ordered testing for you, please be sure to follow up with our office if you have not received results within 7 days after the testing took place.     *If you receive a survey after visiting one of our offices, please take time to share your experience concerning your physician office visit. These surveys are confidential and no health information about you is shared.  We are eager to improve for you and we are counting on your feedback to help make that happen.    
No

## 2025-03-13 ENCOUNTER — APPOINTMENT (OUTPATIENT)
Dept: FAMILY MEDICINE | Facility: CLINIC | Age: 89
End: 2025-03-13

## 2025-04-17 NOTE — ED ADULT TRIAGE NOTE - SPO2 (%)
Talk to the patient over the phone about the results of her screening mammogram.  I did order a diagnostic left mammogram and left breast ultrasound.  She does have an appointment on April 25 to get both of these done.  No other questions   89